# Patient Record
Sex: FEMALE | Race: WHITE | NOT HISPANIC OR LATINO | URBAN - METROPOLITAN AREA
[De-identification: names, ages, dates, MRNs, and addresses within clinical notes are randomized per-mention and may not be internally consistent; named-entity substitution may affect disease eponyms.]

---

## 2017-05-23 ENCOUNTER — OUTPATIENT (OUTPATIENT)
Dept: OUTPATIENT SERVICES | Facility: HOSPITAL | Age: 52
LOS: 1 days | Discharge: HOME | End: 2017-05-23
Payer: COMMERCIAL

## 2017-05-23 VITALS
SYSTOLIC BLOOD PRESSURE: 137 MMHG | HEIGHT: 69 IN | OXYGEN SATURATION: 100 % | DIASTOLIC BLOOD PRESSURE: 80 MMHG | RESPIRATION RATE: 14 BRPM | TEMPERATURE: 98 F | WEIGHT: 210.1 LBS | HEART RATE: 73 BPM

## 2017-05-23 DIAGNOSIS — N95.0 POSTMENOPAUSAL BLEEDING: ICD-10-CM

## 2017-05-23 DIAGNOSIS — Z98.890 OTHER SPECIFIED POSTPROCEDURAL STATES: Chronic | ICD-10-CM

## 2017-05-23 DIAGNOSIS — N85.01 BENIGN ENDOMETRIAL HYPERPLASIA: ICD-10-CM

## 2017-05-23 DIAGNOSIS — N94.19 OTHER SPECIFIED DYSPAREUNIA: ICD-10-CM

## 2017-05-23 DIAGNOSIS — Z98.891 HISTORY OF UTERINE SCAR FROM PREVIOUS SURGERY: Chronic | ICD-10-CM

## 2017-05-23 LAB
APPEARANCE UR: CLEAR — SIGNIFICANT CHANGE UP
BACTERIA # UR AUTO: (no result)
BASOPHILS # BLD AUTO: 0.1 K/UL — SIGNIFICANT CHANGE UP (ref 0–0.2)
BASOPHILS NFR BLD AUTO: 1.1 % — SIGNIFICANT CHANGE UP (ref 0–2)
BILIRUB UR-MCNC: NEGATIVE — SIGNIFICANT CHANGE UP
BLD GP AB SCN SERPL QL: SIGNIFICANT CHANGE UP
COLOR SPEC: YELLOW — SIGNIFICANT CHANGE UP
DIFF PNL FLD: NEGATIVE — SIGNIFICANT CHANGE UP
EOSINOPHIL # BLD AUTO: 0.7 K/UL — HIGH (ref 0–0.5)
EOSINOPHIL NFR BLD AUTO: 11.9 % — HIGH (ref 0–6)
EPI CELLS # UR: NEGATIVE — SIGNIFICANT CHANGE UP
GLUCOSE UR QL: NEGATIVE MG/DL — SIGNIFICANT CHANGE UP
HCT VFR BLD CALC: 39.9 % — SIGNIFICANT CHANGE UP (ref 34.5–45)
HGB BLD-MCNC: 13.7 G/DL — SIGNIFICANT CHANGE UP (ref 11.5–15.5)
KETONES UR-MCNC: NEGATIVE — SIGNIFICANT CHANGE UP
LEUKOCYTE ESTERASE UR-ACNC: (no result)
LYMPHOCYTES # BLD AUTO: 1.9 K/UL — SIGNIFICANT CHANGE UP (ref 1–3.3)
LYMPHOCYTES # BLD AUTO: 32.4 % — SIGNIFICANT CHANGE UP (ref 13–44)
MCHC RBC-ENTMCNC: 30.1 PG — SIGNIFICANT CHANGE UP (ref 27–34)
MCHC RBC-ENTMCNC: 34.4 GM/DL — SIGNIFICANT CHANGE UP (ref 32–36)
MCV RBC AUTO: 87.5 FL — SIGNIFICANT CHANGE UP (ref 80–100)
MONOCYTES # BLD AUTO: 0.3 K/UL — SIGNIFICANT CHANGE UP (ref 0–0.9)
MONOCYTES NFR BLD AUTO: 5.6 % — SIGNIFICANT CHANGE UP (ref 2–14)
NEUTROPHILS # BLD AUTO: 2.9 K/UL — SIGNIFICANT CHANGE UP (ref 1.8–7.4)
NEUTROPHILS NFR BLD AUTO: 49 % — SIGNIFICANT CHANGE UP (ref 43–77)
NITRITE UR-MCNC: NEGATIVE — SIGNIFICANT CHANGE UP
PH UR: 6.5 — SIGNIFICANT CHANGE UP (ref 5–8)
PLATELET # BLD AUTO: 208 K/UL — SIGNIFICANT CHANGE UP (ref 150–400)
PROT UR-MCNC: NEGATIVE MG/DL — SIGNIFICANT CHANGE UP
RBC # BLD: 4.56 M/UL — SIGNIFICANT CHANGE UP (ref 3.8–5.2)
RBC # FLD: 11.1 % — SIGNIFICANT CHANGE UP (ref 10.3–14.5)
RBC CASTS # UR COMP ASSIST: SIGNIFICANT CHANGE UP /HPF (ref 0–4)
SP GR SPEC: 1 — LOW (ref 1.01–1.02)
TYPE + AB SCN PNL BLD: SIGNIFICANT CHANGE UP
UROBILINOGEN FLD QL: NEGATIVE MG/DL — SIGNIFICANT CHANGE UP
WBC # BLD: 5.9 K/UL — SIGNIFICANT CHANGE UP (ref 3.8–10.5)
WBC # FLD AUTO: 5.9 K/UL — SIGNIFICANT CHANGE UP (ref 3.8–10.5)
WBC UR QL: SIGNIFICANT CHANGE UP

## 2017-05-23 PROCEDURE — 93010 ELECTROCARDIOGRAM REPORT: CPT

## 2017-05-23 NOTE — H&P PST ADULT - PSH
History of bladder repair surgery  bladder sling; 2006  S/P  section  X2; ,   S/P dilatation and curettage    S/P shoulder surgery  left;

## 2017-05-23 NOTE — H&P PST ADULT - PMH
History of alcoholism  Ceased drinking 1996.  History of hypercholesterolemia  not on medications at present  Menopausal bleeding  post  Obesity    Snores

## 2017-05-23 NOTE — H&P PST ADULT - FAMILY HISTORY
Mother  Still living? No  Family history of cerebrovascular accident (CVA) in mother, Age at diagnosis: 51-60     Father  Still living? No  Family history of heart disease, Age at diagnosis: 71-80  Family history of diabetes mellitus in father, Age at diagnosis: 71-80     Sibling  Still living? No  Family history of myocardial infarction at age less than 60, Age at diagnosis: 41-50

## 2017-05-23 NOTE — H&P PST ADULT - HISTORY OF PRESENT ILLNESS
This is a  51y /o Female who presents to [facility] prior to proposed procedure with Dr. Ulloa for robotic assisted single site total hysterectomy, bilateral salpingo-oophorectomy. Reports seen by primary GYN Dr. Braun for annual exam and reported intermittent bleeding while on HRT, and referred to Dr. Ulloa for further evaluation. Reports seen by Dr. Ulloa with internal sonogram, and review of internal sonogram. Denies any current vaginal bleeding, pelvic pain, or urinary symptoms. Evaluated by Dr. Ulloa and now presents for said procedure.

## 2017-05-23 NOTE — H&P PST ADULT - ASSESSMENT
This is a  51y /o Female who presents to PST prior to proposed procedure with Dr. Ulloa for robotic assisted single site total hysterectomy, bilateral salpingo-oophorectomy.     1. Labs: CBC, T&S, UA.  2. EKG to be reviewed by Cardiology.  3. Clearance with Dr. Van as scheduled.  4. EZ sponges, holistic sheet, pamphlet, and day of procedure instructions provided and reviewed with patient.

## 2017-05-26 RX ORDER — ONDANSETRON 8 MG/1
4 TABLET, FILM COATED ORAL ONCE
Qty: 0 | Refills: 0 | Status: COMPLETED | OUTPATIENT
Start: 2017-05-30 | End: 2017-05-30

## 2017-05-26 RX ORDER — FENTANYL CITRATE 50 UG/ML
50 INJECTION INTRAVENOUS
Qty: 0 | Refills: 0 | Status: DISCONTINUED | OUTPATIENT
Start: 2017-05-30 | End: 2017-05-30

## 2017-05-26 RX ORDER — OXYCODONE HYDROCHLORIDE 5 MG/1
10 TABLET ORAL ONCE
Qty: 0 | Refills: 0 | Status: DISCONTINUED | OUTPATIENT
Start: 2017-05-30 | End: 2017-05-30

## 2017-05-26 RX ORDER — CELECOXIB 200 MG/1
200 CAPSULE ORAL ONCE
Qty: 0 | Refills: 0 | Status: COMPLETED | OUTPATIENT
Start: 2017-05-30 | End: 2017-05-30

## 2017-05-26 RX ORDER — SODIUM CHLORIDE 9 MG/ML
1000 INJECTION INTRAMUSCULAR; INTRAVENOUS; SUBCUTANEOUS
Qty: 0 | Refills: 0 | Status: DISCONTINUED | OUTPATIENT
Start: 2017-05-30 | End: 2017-05-30

## 2017-05-26 RX ORDER — ACETAMINOPHEN 500 MG
975 TABLET ORAL ONCE
Qty: 0 | Refills: 0 | Status: COMPLETED | OUTPATIENT
Start: 2017-05-30 | End: 2017-05-30

## 2017-05-26 RX ORDER — OXYCODONE HYDROCHLORIDE 5 MG/1
10 TABLET ORAL EVERY 6 HOURS
Qty: 0 | Refills: 0 | Status: DISCONTINUED | OUTPATIENT
Start: 2017-05-30 | End: 2017-05-30

## 2017-05-30 ENCOUNTER — OUTPATIENT (OUTPATIENT)
Dept: OUTPATIENT SERVICES | Facility: HOSPITAL | Age: 52
LOS: 1 days | Discharge: HOME | End: 2017-05-30
Payer: COMMERCIAL

## 2017-05-30 ENCOUNTER — RESULT REVIEW (OUTPATIENT)
Age: 52
End: 2017-05-30

## 2017-05-30 VITALS
RESPIRATION RATE: 14 BRPM | WEIGHT: 210.1 LBS | HEIGHT: 69 IN | OXYGEN SATURATION: 100 % | TEMPERATURE: 97 F | HEART RATE: 82 BPM | DIASTOLIC BLOOD PRESSURE: 74 MMHG | SYSTOLIC BLOOD PRESSURE: 137 MMHG

## 2017-05-30 VITALS
HEART RATE: 99 BPM | RESPIRATION RATE: 16 BRPM | TEMPERATURE: 98 F | OXYGEN SATURATION: 100 % | SYSTOLIC BLOOD PRESSURE: 131 MMHG | DIASTOLIC BLOOD PRESSURE: 79 MMHG

## 2017-05-30 DIAGNOSIS — N94.19 OTHER SPECIFIED DYSPAREUNIA: ICD-10-CM

## 2017-05-30 DIAGNOSIS — Z79.890 HORMONE REPLACEMENT THERAPY: ICD-10-CM

## 2017-05-30 DIAGNOSIS — Z98.890 OTHER SPECIFIED POSTPROCEDURAL STATES: Chronic | ICD-10-CM

## 2017-05-30 DIAGNOSIS — N85.01 BENIGN ENDOMETRIAL HYPERPLASIA: ICD-10-CM

## 2017-05-30 DIAGNOSIS — Z98.891 HISTORY OF UTERINE SCAR FROM PREVIOUS SURGERY: Chronic | ICD-10-CM

## 2017-05-30 DIAGNOSIS — N95.0 POSTMENOPAUSAL BLEEDING: ICD-10-CM

## 2017-05-30 LAB
HCT VFR BLD CALC: 39.4 % — SIGNIFICANT CHANGE UP (ref 34.5–45)
HGB BLD-MCNC: 13.9 G/DL — SIGNIFICANT CHANGE UP (ref 11.5–15.5)

## 2017-05-30 PROCEDURE — S2900 ROBOTIC SURGICAL SYSTEM: CPT | Mod: NC

## 2017-05-30 PROCEDURE — 58571 TLH W/T/O 250 G OR LESS: CPT | Mod: 80

## 2017-05-30 PROCEDURE — 88307 TISSUE EXAM BY PATHOLOGIST: CPT | Mod: 26

## 2017-05-30 PROCEDURE — 88305 TISSUE EXAM BY PATHOLOGIST: CPT | Mod: 26

## 2017-05-30 RX ORDER — HYDROMORPHONE HYDROCHLORIDE 2 MG/ML
1 INJECTION INTRAMUSCULAR; INTRAVENOUS; SUBCUTANEOUS EVERY 4 HOURS
Qty: 0 | Refills: 0 | Status: DISCONTINUED | OUTPATIENT
Start: 2017-05-30 | End: 2017-05-30

## 2017-05-30 RX ORDER — SODIUM CHLORIDE 9 MG/ML
3 INJECTION INTRAMUSCULAR; INTRAVENOUS; SUBCUTANEOUS EVERY 8 HOURS
Qty: 0 | Refills: 0 | Status: DISCONTINUED | OUTPATIENT
Start: 2017-05-30 | End: 2017-05-30

## 2017-05-30 RX ORDER — IBUPROFEN 200 MG
600 TABLET ORAL EVERY 6 HOURS
Qty: 0 | Refills: 0 | Status: DISCONTINUED | OUTPATIENT
Start: 2017-05-30 | End: 2017-05-30

## 2017-05-30 RX ORDER — SODIUM CHLORIDE 9 MG/ML
1000 INJECTION, SOLUTION INTRAVENOUS
Qty: 0 | Refills: 0 | Status: DISCONTINUED | OUTPATIENT
Start: 2017-05-30 | End: 2017-05-30

## 2017-05-30 RX ORDER — ONDANSETRON 8 MG/1
4 TABLET, FILM COATED ORAL EVERY 6 HOURS
Qty: 0 | Refills: 0 | Status: DISCONTINUED | OUTPATIENT
Start: 2017-05-30 | End: 2017-05-30

## 2017-05-30 RX ADMIN — FENTANYL CITRATE 50 MICROGRAM(S): 50 INJECTION INTRAVENOUS at 18:55

## 2017-05-30 RX ADMIN — SODIUM CHLORIDE 75 MILLILITER(S): 9 INJECTION INTRAMUSCULAR; INTRAVENOUS; SUBCUTANEOUS at 19:00

## 2017-05-30 RX ADMIN — Medication 975 MILLIGRAM(S): at 12:40

## 2017-05-30 RX ADMIN — OXYCODONE HYDROCHLORIDE 10 MILLIGRAM(S): 5 TABLET ORAL at 12:40

## 2017-05-30 RX ADMIN — ONDANSETRON 4 MILLIGRAM(S): 8 TABLET, FILM COATED ORAL at 18:55

## 2017-05-30 RX ADMIN — CELECOXIB 200 MILLIGRAM(S): 200 CAPSULE ORAL at 12:40

## 2017-05-30 RX ADMIN — FENTANYL CITRATE 50 MICROGRAM(S): 50 INJECTION INTRAVENOUS at 20:00

## 2017-05-30 NOTE — BRIEF OPERATIVE NOTE - PROCEDURE
Exam under anesthesia  05/30/2017    Active  JRWAGNER  Lysis of adhesions  05/30/2017    Active  JRWAGNER  Bilateral salpingo-oophorectomy  05/30/2017    Active  JRWAGNER  Hysterectomy  05/30/2017    Active  JRWAGNER  Robot-assisted laparoscopic procedure using single incision technique  05/30/2017    Active  JRWAGNER Robot-assisted laparoscopic procedure using single incision technique  05/30/2017    Rupert Ventura  Lysis of adhesions  05/30/2017    Rupert Ventura  Bilateral salpingo-oophorectomy  05/30/2017    Rupert eVntura  Exam under anesthesia  05/30/2017    Rupert Ventura  Hysterectomy  05/30/2017    Rupert Ventura Robot-assisted laparoscopic procedure using single incision technique  05/30/2017    Rupert Ventura  Lysis of adhesions  05/30/2017    Rupert Ventura  Bilateral salpingo-oophorectomy  05/30/2017    Rupert Ventura  Exam under anesthesia  05/30/2017    Rupert Ventura  Hysterectomy  05/30/2017    Rupert Ventura

## 2017-05-30 NOTE — ASU DISCHARGE PLAN (ADULT/PEDIATRIC). - ACTIVITY LEVEL
no douching/no tampons/no intercourse/vaginal restrictions only/exercise/weight bearing as tolerated/nothing per vagina

## 2017-05-30 NOTE — BRIEF OPERATIVE NOTE - PRE-OP DX
Postmenopausal bleeding  05/30/2017  recurrent  Active  Rupert Ulloa  Vasomotor symptoms due to menopause  05/30/2017    Active  Rupert Ulloa

## 2017-05-30 NOTE — ASU PATIENT PROFILE, ADULT - VISION (WITH CORRECTIVE LENSES IF THE PATIENT USUALLY WEARS THEM):
Partially impaired: cannot see medication labels or newsprint, but can see obstacles in path, and the surrounding layout; can count fingers at arm's length/reading glasses PRN

## 2017-05-30 NOTE — BRIEF OPERATIVE NOTE - OPERATION/FINDINGS
6-8 week uterus; normal adnexa; normal abdomen 6-8 week uterus; normal adnexa; normal abdomen; normal bladder

## 2017-06-01 LAB — SURGICAL PATHOLOGY FINAL REPORT - CH: SIGNIFICANT CHANGE UP

## 2017-06-06 DIAGNOSIS — E66.9 OBESITY, UNSPECIFIED: ICD-10-CM

## 2017-06-06 DIAGNOSIS — Z88.8 ALLERGY STATUS TO OTHER DRUGS, MEDICAMENTS AND BIOLOGICAL SUBSTANCES STATUS: ICD-10-CM

## 2017-06-06 DIAGNOSIS — N73.6 FEMALE PELVIC PERITONEAL ADHESIONS (POSTINFECTIVE): ICD-10-CM

## 2017-06-06 DIAGNOSIS — R01.1 CARDIAC MURMUR, UNSPECIFIED: ICD-10-CM

## 2017-06-06 DIAGNOSIS — Z79.890 HORMONE REPLACEMENT THERAPY: ICD-10-CM

## 2017-06-06 DIAGNOSIS — N72 INFLAMMATORY DISEASE OF CERVIX UTERI: ICD-10-CM

## 2017-06-06 DIAGNOSIS — E78.5 HYPERLIPIDEMIA, UNSPECIFIED: ICD-10-CM

## 2017-06-06 DIAGNOSIS — N80.0 ENDOMETRIOSIS OF UTERUS: ICD-10-CM

## 2017-06-06 DIAGNOSIS — N95.0 POSTMENOPAUSAL BLEEDING: ICD-10-CM

## 2018-01-15 ENCOUNTER — EMERGENCY (EMERGENCY)
Facility: HOSPITAL | Age: 53
LOS: 0 days | Discharge: HOME | End: 2018-01-15
Attending: EMERGENCY MEDICINE | Admitting: EMERGENCY MEDICINE
Payer: COMMERCIAL

## 2018-01-15 VITALS — WEIGHT: 199.96 LBS | HEIGHT: 69 IN

## 2018-01-15 VITALS
HEART RATE: 95 BPM | DIASTOLIC BLOOD PRESSURE: 80 MMHG | RESPIRATION RATE: 16 BRPM | TEMPERATURE: 98 F | OXYGEN SATURATION: 100 % | SYSTOLIC BLOOD PRESSURE: 140 MMHG

## 2018-01-15 DIAGNOSIS — Z98.890 OTHER SPECIFIED POSTPROCEDURAL STATES: Chronic | ICD-10-CM

## 2018-01-15 DIAGNOSIS — R05 COUGH: ICD-10-CM

## 2018-01-15 DIAGNOSIS — B34.9 VIRAL INFECTION, UNSPECIFIED: ICD-10-CM

## 2018-01-15 DIAGNOSIS — Z98.891 HISTORY OF UTERINE SCAR FROM PREVIOUS SURGERY: Chronic | ICD-10-CM

## 2018-01-15 LAB
ANION GAP SERPL CALC-SCNC: 7 MMOL/L — SIGNIFICANT CHANGE UP (ref 5–17)
BASOPHILS # BLD AUTO: 0.1 K/UL — SIGNIFICANT CHANGE UP (ref 0–0.2)
BASOPHILS NFR BLD AUTO: 1.2 % — SIGNIFICANT CHANGE UP (ref 0–2)
BUN SERPL-MCNC: 12 MG/DL — SIGNIFICANT CHANGE UP (ref 7–23)
CALCIUM SERPL-MCNC: 9.2 MG/DL — SIGNIFICANT CHANGE UP (ref 8.5–10.1)
CHLORIDE SERPL-SCNC: 103 MMOL/L — SIGNIFICANT CHANGE UP (ref 96–108)
CO2 SERPL-SCNC: 26 MMOL/L — SIGNIFICANT CHANGE UP (ref 22–31)
CREAT SERPL-MCNC: 0.81 MG/DL — SIGNIFICANT CHANGE UP (ref 0.5–1.3)
EOSINOPHIL # BLD AUTO: 0.9 K/UL — HIGH (ref 0–0.5)
EOSINOPHIL NFR BLD AUTO: 11.3 % — HIGH (ref 0–6)
GLUCOSE SERPL-MCNC: 112 MG/DL — HIGH (ref 70–99)
HCT VFR BLD CALC: 42.3 % — SIGNIFICANT CHANGE UP (ref 34.5–45)
HGB BLD-MCNC: 14.4 G/DL — SIGNIFICANT CHANGE UP (ref 11.5–15.5)
LYMPHOCYTES # BLD AUTO: 1.5 K/UL — SIGNIFICANT CHANGE UP (ref 1–3.3)
LYMPHOCYTES # BLD AUTO: 19.1 % — SIGNIFICANT CHANGE UP (ref 13–44)
MCHC RBC-ENTMCNC: 29.2 PG — SIGNIFICANT CHANGE UP (ref 27–34)
MCHC RBC-ENTMCNC: 34.1 GM/DL — SIGNIFICANT CHANGE UP (ref 32–36)
MCV RBC AUTO: 85.6 FL — SIGNIFICANT CHANGE UP (ref 80–100)
MONOCYTES # BLD AUTO: 0.6 K/UL — SIGNIFICANT CHANGE UP (ref 0–0.9)
MONOCYTES NFR BLD AUTO: 8.1 % — SIGNIFICANT CHANGE UP (ref 2–14)
NEUTROPHILS # BLD AUTO: 4.6 K/UL — SIGNIFICANT CHANGE UP (ref 1.8–7.4)
NEUTROPHILS NFR BLD AUTO: 60.2 % — SIGNIFICANT CHANGE UP (ref 43–77)
PLATELET # BLD AUTO: 216 K/UL — SIGNIFICANT CHANGE UP (ref 150–400)
POTASSIUM SERPL-MCNC: 4.2 MMOL/L — SIGNIFICANT CHANGE UP (ref 3.5–5.3)
POTASSIUM SERPL-SCNC: 4.2 MMOL/L — SIGNIFICANT CHANGE UP (ref 3.5–5.3)
RBC # BLD: 4.95 M/UL — SIGNIFICANT CHANGE UP (ref 3.8–5.2)
RBC # FLD: 11.5 % — SIGNIFICANT CHANGE UP (ref 10.3–14.5)
SODIUM SERPL-SCNC: 136 MMOL/L — SIGNIFICANT CHANGE UP (ref 135–145)
WBC # BLD: 7.7 K/UL — SIGNIFICANT CHANGE UP (ref 3.8–10.5)
WBC # FLD AUTO: 7.7 K/UL — SIGNIFICANT CHANGE UP (ref 3.8–10.5)

## 2018-01-15 PROCEDURE — 71046 X-RAY EXAM CHEST 2 VIEWS: CPT | Mod: 26

## 2018-01-15 PROCEDURE — 99285 EMERGENCY DEPT VISIT HI MDM: CPT | Mod: 25

## 2018-01-15 RX ORDER — ALBUTEROL 90 UG/1
2 AEROSOL, METERED ORAL
Qty: 1 | Refills: 0
Start: 2018-01-15 | End: 2018-02-13

## 2018-01-15 RX ORDER — ONDANSETRON 8 MG/1
4 TABLET, FILM COATED ORAL ONCE
Qty: 0 | Refills: 0 | Status: COMPLETED | OUTPATIENT
Start: 2018-01-15 | End: 2018-01-15

## 2018-01-15 RX ORDER — IPRATROPIUM/ALBUTEROL SULFATE 18-103MCG
3 AEROSOL WITH ADAPTER (GRAM) INHALATION ONCE
Qty: 0 | Refills: 0 | Status: COMPLETED | OUTPATIENT
Start: 2018-01-15 | End: 2018-01-15

## 2018-01-15 RX ADMIN — Medication 3 MILLILITER(S): at 22:31

## 2018-01-15 NOTE — ED ADULT TRIAGE NOTE - CHIEF COMPLAINT QUOTE
Pt c/o + pnx, bronchitis + flu _ , left lung inflammed - per pt's MD Pt c/o + pnx, +bronchitis + flu _ , left lung inflammed - per pt's MD

## 2018-01-15 NOTE — ED STATDOCS - ATTENDING CONTRIBUTION TO CARE
Attending Contribution to Care: I, Amparo Buchanan, performed the initial face to face bedside interview with this patient regarding history of present illness, review of symptoms and relevant past medical, social and family history.  I completed an independent physical examination.  I was the initial provider who evaluated this patient and the history, physical, and MDM reflect this intial assessment. I have signed out the follow up of any pending tests after the original (i.e. labs, radiological studies) to the ACP with instructions to review any with instructions to review any concerning findings to me prior to discharge.  I have communicated the patient’s plan of care and disposition with the ACP.

## 2018-01-15 NOTE — ED STATDOCS - PROGRESS NOTE DETAILS
Patient seen and evaluated, ED attending note and orders reviewed, will continue with patient follow up and care -Cam Cortez PA-C

## 2018-01-15 NOTE — ED STATDOCS - OBJECTIVE STATEMENT
51 y/o F w/ pmhx of HLD presents to ED c/o dry cough and difficulty breathing. Pt states she was tested positive for PNA and Bronchitis yesterday at Urgent care, give amoxicillin but w/o relief. MD stated her left lung was inflamed. PMD Dr. Guzmán. No other concerns.

## 2018-01-15 NOTE — ED ADULT NURSE REASSESSMENT NOTE - NS ED NURSE REASSESS COMMENT FT1
Pt c/o nausea and feeling hot. Vitals stable. JUSTA Cortez made aware. Will cont to monitor for safety and comfort.

## 2018-06-30 ENCOUNTER — EMERGENCY (EMERGENCY)
Facility: HOSPITAL | Age: 53
LOS: 0 days | Discharge: HOME | End: 2018-06-30
Attending: EMERGENCY MEDICINE
Payer: COMMERCIAL

## 2018-06-30 VITALS
DIASTOLIC BLOOD PRESSURE: 85 MMHG | HEART RATE: 89 BPM | SYSTOLIC BLOOD PRESSURE: 140 MMHG | OXYGEN SATURATION: 100 % | RESPIRATION RATE: 16 BRPM | TEMPERATURE: 98 F

## 2018-06-30 VITALS — HEIGHT: 69 IN | WEIGHT: 190.04 LBS

## 2018-06-30 DIAGNOSIS — S93.402A SPRAIN OF UNSPECIFIED LIGAMENT OF LEFT ANKLE, INITIAL ENCOUNTER: ICD-10-CM

## 2018-06-30 DIAGNOSIS — E78.5 HYPERLIPIDEMIA, UNSPECIFIED: ICD-10-CM

## 2018-06-30 DIAGNOSIS — M25.572 PAIN IN LEFT ANKLE AND JOINTS OF LEFT FOOT: ICD-10-CM
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
## 2018-06-30 DIAGNOSIS — Z98.890 OTHER SPECIFIED POSTPROCEDURAL STATES: Chronic | ICD-10-CM
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
## 2018-06-30 DIAGNOSIS — F32.9 MAJOR DEPRESSIVE DISORDER, SINGLE EPISODE, UNSPECIFIED: ICD-10-CM

## 2018-06-30 DIAGNOSIS — F41.9 ANXIETY DISORDER, UNSPECIFIED: ICD-10-CM

## 2018-06-30 DIAGNOSIS — Z98.891 HISTORY OF UTERINE SCAR FROM PREVIOUS SURGERY: Chronic | ICD-10-CM

## 2018-06-30 PROCEDURE — 99283 EMERGENCY DEPT VISIT LOW MDM: CPT | Mod: 25

## 2018-06-30 PROCEDURE — 73620 X-RAY EXAM OF FOOT: CPT | Mod: 26

## 2018-06-30 PROCEDURE — 73610 X-RAY EXAM OF ANKLE: CPT | Mod: 26,LT

## 2018-06-30 NOTE — ED STATDOCS - OBJECTIVE STATEMENT
52F with pmh HLD, depression/anxiety, presents with L ankle pain, swellign s/p inversion injury. pt inverted ankle while stepping off bed two days ago, felt a "pop" and pain at that time but then improved. iced and elevated. today was stepping down step, inverted once again. pain at that tiem but mostly resolved. +swelling lateral aspect with min discomfort with ambulation. without numbness/weakness. without fall or any other injury. - Meño Watts MD

## 2018-06-30 NOTE — ED STATDOCS - MEDICAL DECISION MAKING DETAILS
L ankle swelling, minimal ttp s/p inversion injury. plan to obtain xrays, low suspicion of fracture. likely ace wrap and d/c. - Meño Watts MD

## 2018-06-30 NOTE — ED STATDOCS - PROGRESS NOTE DETAILS
pt re-assessed. feels improved. updated on neg xray results. discussed proper f/u instructions and indications to return. able to ambulate without assistance. An opportunity to ask questions was provided and all answered. stable for d/c. - Meño Watts MD

## 2018-06-30 NOTE — ED STATDOCS - PLAN OF CARE
1. Rest, ice, and elevated ankle  2. Take Tylenol as directed for pain.   3. Take motrin, 600mg every 6 hours as needed for pain.   4. Follow-up with your primary care doctor or [facility] at [phone] in 3-5 days   5. Return immediately for any worsening or concerning symptoms as discussed

## 2018-06-30 NOTE — ED ADULT NURSE NOTE - OBJECTIVE STATEMENT
s/p mechanical fall on sidewalk, fell to ground, denies head injury. c/o lateral left ankle pain. able to ambulate but with pain

## 2018-06-30 NOTE — ED STATDOCS - CARE PLAN
Principal Discharge DX:	Sprain of left ankle, unspecified ligament, initial encounter  Assessment and plan of treatment:	1. Rest, ice, and elevated ankle  2. Take Tylenol as directed for pain.   3. Take motrin, 600mg every 6 hours as needed for pain.   4. Follow-up with your primary care doctor or [facility] at [phone] in 3-5 days   5. Return immediately for any worsening or concerning symptoms as discussed

## 2018-09-18 PROBLEM — N92.4 EXCESSIVE BLEEDING IN THE PREMENOPAUSAL PERIOD: Chronic | Status: ACTIVE | Noted: 2017-05-23

## 2018-09-18 PROBLEM — Z86.39 PERSONAL HISTORY OF OTHER ENDOCRINE, NUTRITIONAL AND METABOLIC DISEASE: Chronic | Status: ACTIVE | Noted: 2017-05-23

## 2018-09-18 PROBLEM — R06.83 SNORING: Chronic | Status: ACTIVE | Noted: 2017-05-23

## 2018-09-18 PROBLEM — F10.21 ALCOHOL DEPENDENCE, IN REMISSION: Chronic | Status: ACTIVE | Noted: 2017-05-23

## 2018-09-18 PROBLEM — E66.9 OBESITY, UNSPECIFIED: Chronic | Status: ACTIVE | Noted: 2017-05-23

## 2018-09-26 ENCOUNTER — APPOINTMENT (OUTPATIENT)
Dept: CARDIOLOGY | Facility: CLINIC | Age: 53
End: 2018-09-26
Payer: COMMERCIAL

## 2018-09-26 VITALS
HEART RATE: 97 BPM | SYSTOLIC BLOOD PRESSURE: 140 MMHG | BODY MASS INDEX: 29.77 KG/M2 | DIASTOLIC BLOOD PRESSURE: 80 MMHG | WEIGHT: 201 LBS | RESPIRATION RATE: 16 BRPM | HEIGHT: 69 IN

## 2018-09-26 DIAGNOSIS — Z82.49 FAMILY HISTORY OF ISCHEMIC HEART DISEASE AND OTHER DISEASES OF THE CIRCULATORY SYSTEM: ICD-10-CM

## 2018-09-26 DIAGNOSIS — Z83.6 FAMILY HISTORY OF OTHER DISEASES OF THE RESPIRATORY SYSTEM: ICD-10-CM

## 2018-09-26 DIAGNOSIS — F10.21 ALCOHOL DEPENDENCE, IN REMISSION: ICD-10-CM

## 2018-09-26 DIAGNOSIS — Z78.9 OTHER SPECIFIED HEALTH STATUS: ICD-10-CM

## 2018-09-26 PROCEDURE — 93000 ELECTROCARDIOGRAM COMPLETE: CPT

## 2018-09-26 PROCEDURE — 99204 OFFICE O/P NEW MOD 45 MIN: CPT

## 2018-09-26 RX ORDER — ROSUVASTATIN CALCIUM 10 MG/1
10 TABLET, FILM COATED ORAL
Qty: 90 | Refills: 0 | Status: ACTIVE | COMMUNITY

## 2018-10-26 ENCOUNTER — APPOINTMENT (OUTPATIENT)
Dept: CARDIOLOGY | Facility: CLINIC | Age: 53
End: 2018-10-26
Payer: COMMERCIAL

## 2018-10-26 PROCEDURE — 93306 TTE W/DOPPLER COMPLETE: CPT

## 2018-10-30 ENCOUNTER — APPOINTMENT (OUTPATIENT)
Dept: CARDIOLOGY | Facility: CLINIC | Age: 53
End: 2018-10-30
Payer: COMMERCIAL

## 2018-10-30 PROCEDURE — 93015 CV STRESS TEST SUPVJ I&R: CPT

## 2018-10-30 PROCEDURE — 78452 HT MUSCLE IMAGE SPECT MULT: CPT

## 2018-10-30 PROCEDURE — A9500: CPT

## 2018-11-01 RX ORDER — KIT FOR THE PREPARATION OF TECHNETIUM TC99M SESTAMIBI 1 MG/5ML
INJECTION, POWDER, LYOPHILIZED, FOR SOLUTION PARENTERAL
Refills: 0 | Status: COMPLETED | OUTPATIENT
Start: 2018-11-01

## 2018-11-01 RX ADMIN — KIT FOR THE PREPARATION OF TECHNETIUM TC99M SESTAMIBI 0: 1 INJECTION, POWDER, LYOPHILIZED, FOR SOLUTION PARENTERAL at 00:00

## 2018-11-09 ENCOUNTER — APPOINTMENT (OUTPATIENT)
Dept: CARDIOLOGY | Facility: CLINIC | Age: 53
End: 2018-11-09
Payer: COMMERCIAL

## 2018-11-09 VITALS
HEART RATE: 78 BPM | HEIGHT: 69 IN | BODY MASS INDEX: 30.07 KG/M2 | SYSTOLIC BLOOD PRESSURE: 120 MMHG | RESPIRATION RATE: 18 BRPM | WEIGHT: 203 LBS | DIASTOLIC BLOOD PRESSURE: 80 MMHG

## 2018-11-09 PROCEDURE — 99214 OFFICE O/P EST MOD 30 MIN: CPT

## 2018-11-09 RX ORDER — METOPROLOL SUCCINATE 25 MG/1
25 TABLET, EXTENDED RELEASE ORAL DAILY
Qty: 90 | Refills: 3 | Status: DISCONTINUED | COMMUNITY
Start: 2018-09-26 | End: 2018-11-09

## 2018-11-09 NOTE — ASSESSMENT
[FreeTextEntry1] : ECG:   Normal sinus rhythm at 78 bpm.  Poor R-wave progression.  No significant ST-T wave changes. \par \par Echocardiogram 10/26/18:\par  Overall normal LV size and function. Ejection fraction 60%. Mild focal aortic valve sclerosis.\par \par Exercise sestamibi stress test 10/30/18:\par Patient exercised 9 minutes 45 seconds (11 Mets).\par Peak heart rate 173 (104% maximum).\par Blood pressure response normal\par No ischemic ECG changes\par No chest pain.\par SPECT imaging shows no evidence of any ischemia.\par \par No laboratory data is available. \par \par Impression: \par 1. This is a 53-year-old female with surgical menopause more than a year ago presenting with a chest pain syndrome that has some typical and atypical features.  Additionally, she has risk factors of hyperlipidemia, very strong family history of premature coronary disease, and a chest pain syndrome. \par \par The stress test reveals good functional capacity with no evidence of reproducible pain or ischemic ECG changes or perfusion abnormalities had a very good workload. As such, I think it highly unlikely that the patient's chest pain or shortness of breath is due to active coronary artery disease. She was reassured of this.\par The arm pain does not seem cardiac in nature.   More likely is musculoskeletal, though a radiculopathy cannot be excluded.  \par \par \par Because of her heart murmur and shortness of breath, an echocardiogram was performed and demonstrated a focal aortic valve sclerosis which likely accounts for the murmur. There are no other significant structural abnormalities.\par \par Copies of her blood work will be obtained and reviewed.  \par I encouraged the patient to begin a regular symptom limited graduated exercise program. She is to contact me with regard to any new or progression of symptoms. She is to contact me to review her blood work. A 6 month followup is recommended.\par Pending the results of the testing, it is recommended that the patient begin Ecotrin 81 mg a day and Toprol XL 25 mg a day. \par

## 2018-11-09 NOTE — REASON FOR VISIT
[FreeTextEntry1] : 53-year-old female presents here for cardiac re- evaluation after her non invasive testing..  Her concerns include chest pain and left arm pain.\par \par Symptoms began a couple of weeks ago.  She describes a squeezing retrosternal discomfort which can occur nearly every day, last 1-2 minutes at a time.  It can occur when she is at work, but not necessary with any position, movement, or activity.  \par \par She has had exertional dyspnea for the last 6 months but does not describe any exertional chest pain.  \par \par She additional complains of some left arm pain, however, this does not necessarily occur in tandem with the chest pain.  That seems to have a positional component. \par \par There is no preexisting history of any cardiovascular disease but she has had some heart flutters.  \par \par She is under a fair amount of stress within the last few months having had some major domestic upheaval in her life.  \par \par Denies diabetes or hypertension, however does have:\par 1.  A 20-pack year of cigarette smoking stopped 15 years ago.   \par 2.  Hyperlipidemia.\par 3.  A strong family history of premature coronary disease which includes:\par 	1.  Brother  of MI age 43. \par 	2.  Father bypassed in his 60s and then had a redo. \par 	3.  Mother had a traumatic hemorrhagic CVA.\par \par She does not exercise regularly.  She does not exercise regularly.  She describes extreme fatigue from her very busy job and family life.  \par \par She does not have any other significant medical history.  \par

## 2018-12-21 ENCOUNTER — APPOINTMENT (OUTPATIENT)
Dept: OBGYN | Facility: CLINIC | Age: 53
End: 2018-12-21
Payer: COMMERCIAL

## 2018-12-21 VITALS
WEIGHT: 203 LBS | HEIGHT: 69 IN | DIASTOLIC BLOOD PRESSURE: 80 MMHG | SYSTOLIC BLOOD PRESSURE: 122 MMHG | BODY MASS INDEX: 30.07 KG/M2

## 2018-12-21 PROCEDURE — 99214 OFFICE O/P EST MOD 30 MIN: CPT

## 2018-12-21 RX ORDER — ESTRADIOL AND LEVONORGESTREL .045; .015 MG/D; MG/D
0.045-0.015 PATCH TRANSDERMAL
Refills: 0 | Status: COMPLETED | COMMUNITY
End: 2018-12-21

## 2019-05-01 ENCOUNTER — APPOINTMENT (OUTPATIENT)
Dept: CARDIOLOGY | Facility: CLINIC | Age: 54
End: 2019-05-01
Payer: COMMERCIAL

## 2019-05-01 ENCOUNTER — NON-APPOINTMENT (OUTPATIENT)
Age: 54
End: 2019-05-01

## 2019-05-01 VITALS
HEIGHT: 69 IN | WEIGHT: 206 LBS | BODY MASS INDEX: 30.51 KG/M2 | RESPIRATION RATE: 16 BRPM | DIASTOLIC BLOOD PRESSURE: 70 MMHG | SYSTOLIC BLOOD PRESSURE: 130 MMHG | HEART RATE: 83 BPM

## 2019-05-01 PROCEDURE — 93000 ELECTROCARDIOGRAM COMPLETE: CPT

## 2019-05-01 PROCEDURE — 99214 OFFICE O/P EST MOD 30 MIN: CPT

## 2019-05-01 NOTE — REASON FOR VISIT
[FreeTextEntry1] : 53-year-old female presents here for cardiac re- evaluation.  Her chest pain, WERNER and left arm pain have all resolved.\par \par She has been going to the gym 3 times a week for the last several months. 20 minutes of aerobic exercise and 40 minutes of resistance. There are no associated symptoms when she exercises.\par It is watching her diet more carefully, however, has not had any weight loss.\par \par Symptoms were a squeezing retrosternal discomfort  nearly every day, last 1-2 minutes at a time.  It can occur when she is at work, but not necessary with any position, movement, or activity.  \par She has had exertional dyspnea for the last 6 months but does not describe any exertional chest pain.  \par left arm pain did not necessarily occur in tandem with the chest pain.  That seems to have a positional component. \par \par There is no preexisting history of any cardiovascular disease but she has had some heart flutters.  \par \par She is under a fair amount of stress within the last few months having had some major domestic upheaval in her life.  \par \par Denies diabetes or hypertension, however does have:\par 1.  A 20-pack year of cigarette smoking stopped 15 years ago.   \par 2.  Hyperlipidemia.\par 3.  A strong family history of premature coronary disease which includes:\par 	1.  Brother  of MI age 43. \par 	2.  Father bypassed in his 60s and then had a redo. \par 	3.  Mother had a traumatic hemorrhagic CVA.\par \par She does not have any other significant medical history.  \par

## 2019-05-01 NOTE — ASSESSMENT
[FreeTextEntry1] : ECG:   Normal sinus rhythm at 83 bpm.  Poor R-wave progression.  No significant ST-T wave changes. \par \par Echocardiogram 10/26/18:\par  Overall normal LV size and function. Ejection fraction 60%. Mild focal aortic valve sclerosis.\par \par Exercise sestamibi stress test 10/30/18:\par Patient exercised 9 minutes 45 seconds (11 Mets).\par Peak heart rate 173 (104% maximum).\par Blood pressure response normal\par No ischemic ECG changes\par No chest pain.\par SPECT imaging shows no evidence of any ischemia.\par \par No laboratory data is available. \par \par Impression: \par 1. This is a 53-year-old female with surgical menopause more than a year ago with a chest pain syndrome that had some typical and atypical features, but has now entirely resolved.  \par \par 2. She has risk factors of hyperlipidemia, very strong family history of premature coronary disease\par \par 3. The stress test reveals good functional capacity with no evidence of reproducible pain or ischemic ECG changes or perfusion abnormalities had a very good workload. \par \par 4. Because of her heart murmur and shortness of breath, an echocardiogram was performed and demonstrated a focal aortic valve sclerosis which likely accounts for the murmur. There are no other significant structural abnormalities.\par \par Plan;  \par 1. \par I encouraged the patient to accelerate her exercise program. She is to contact me with regard to any new or progression of symptoms.\par \par 2.  She is to obtain blood work. \par \par 3. Instructed the patient about the benefits of a diet that restricts portion sizes, increases frequency of meals and consists of  vegetables, (more green and leafy),fruit and nuts, whole grains, lean proteins and limits carbohydrates and meat and dairy fats\par \par A 6 month followup is recommended.\par Pending the results of the testing, it is recommended that the patient begin Ecotrin 81 mg a day and Toprol XL 25 mg a day. \par

## 2019-06-12 ENCOUNTER — RX RENEWAL (OUTPATIENT)
Age: 54
End: 2019-06-12

## 2019-06-19 ENCOUNTER — RX RENEWAL (OUTPATIENT)
Age: 54
End: 2019-06-19

## 2019-10-11 ENCOUNTER — APPOINTMENT (OUTPATIENT)
Dept: CARDIOLOGY | Facility: CLINIC | Age: 54
End: 2019-10-11

## 2019-12-04 NOTE — ASU PREOP CHECKLIST - NS PREOP CHK CHLOROHEX WASH
I have reviewed discharge instructions with the patient and spouse. The patient and spouse verbalized understanding. Pt provided with prescriptions, discharge instruction, and information sheets. No further questions at this time. at home:

## 2020-01-06 ENCOUNTER — RX RENEWAL (OUTPATIENT)
Age: 55
End: 2020-01-06

## 2020-08-25 ENCOUNTER — APPOINTMENT (OUTPATIENT)
Dept: OBGYN | Facility: CLINIC | Age: 55
End: 2020-08-25
Payer: COMMERCIAL

## 2020-08-25 VITALS
HEIGHT: 69 IN | BODY MASS INDEX: 31.25 KG/M2 | SYSTOLIC BLOOD PRESSURE: 140 MMHG | DIASTOLIC BLOOD PRESSURE: 84 MMHG | TEMPERATURE: 98.2 F | WEIGHT: 211 LBS

## 2020-08-25 DIAGNOSIS — Z87.891 PERSONAL HISTORY OF NICOTINE DEPENDENCE: ICD-10-CM

## 2020-08-25 DIAGNOSIS — N95.1 MENOPAUSAL AND FEMALE CLIMACTERIC STATES: ICD-10-CM

## 2020-08-25 PROCEDURE — 99396 PREV VISIT EST AGE 40-64: CPT

## 2020-08-25 NOTE — PHYSICAL EXAM
[Awake] : awake [Alert] : alert [Acute Distress] : no acute distress [LAD] : no lymphadenopathy [Mass] : no breast mass [Thyroid Nodule] : no thyroid nodule [Goiter] : no goiter [Soft] : soft [Nipple Discharge] : no nipple discharge [Axillary LAD] : no axillary lymphadenopathy [Distended] : not distended [Tender] : non tender [H/Smegaly] : no hepatosplenomegaly [Oriented x3] : oriented to person, place, and time [Flat Affect] : affect not flat [Depressed Mood] : not depressed [Vulvar Atrophy] : no vulvar atrophy [Normal] : external genitalia [No Bleeding] : there was no active vaginal bleeding [Atrophy] : atrophy [Occult Blood] : occult blood test from digital rectal exam was negative [Uterine Adnexae] : were not tender and not enlarged [Absent] : absent

## 2020-08-25 NOTE — CHIEF COMPLAINT
[Annual Visit] : annual visit [FreeTextEntry1] : 54 y/o pt presents for annual visit with hx of hysterectomy

## 2020-09-14 ENCOUNTER — RESULT REVIEW (OUTPATIENT)
Age: 55
End: 2020-09-14

## 2020-09-14 ENCOUNTER — APPOINTMENT (OUTPATIENT)
Dept: MAMMOGRAPHY | Facility: CLINIC | Age: 55
End: 2020-09-14
Payer: COMMERCIAL

## 2020-09-14 ENCOUNTER — OUTPATIENT (OUTPATIENT)
Dept: OUTPATIENT SERVICES | Facility: HOSPITAL | Age: 55
LOS: 1 days | End: 2020-09-14
Payer: COMMERCIAL

## 2020-09-14 DIAGNOSIS — Z98.890 OTHER SPECIFIED POSTPROCEDURAL STATES: Chronic | ICD-10-CM

## 2020-09-14 DIAGNOSIS — Z00.8 ENCOUNTER FOR OTHER GENERAL EXAMINATION: ICD-10-CM

## 2020-09-14 DIAGNOSIS — Z98.891 HISTORY OF UTERINE SCAR FROM PREVIOUS SURGERY: Chronic | ICD-10-CM

## 2020-09-14 PROCEDURE — 77067 SCR MAMMO BI INCL CAD: CPT

## 2020-09-14 PROCEDURE — 77063 BREAST TOMOSYNTHESIS BI: CPT | Mod: 26

## 2020-09-14 PROCEDURE — 77067 SCR MAMMO BI INCL CAD: CPT | Mod: 26

## 2020-09-14 PROCEDURE — 77063 BREAST TOMOSYNTHESIS BI: CPT

## 2020-09-28 ENCOUNTER — TRANSCRIPTION ENCOUNTER (OUTPATIENT)
Age: 55
End: 2020-09-28

## 2021-02-25 ENCOUNTER — INPATIENT (INPATIENT)
Facility: HOSPITAL | Age: 56
LOS: 0 days | Discharge: HOME | DRG: 287 | End: 2021-02-26
Attending: FAMILY MEDICINE | Admitting: HOSPITALIST
Payer: COMMERCIAL

## 2021-02-25 VITALS
SYSTOLIC BLOOD PRESSURE: 157 MMHG | OXYGEN SATURATION: 98 % | RESPIRATION RATE: 18 BRPM | TEMPERATURE: 97 F | HEIGHT: 69 IN | DIASTOLIC BLOOD PRESSURE: 88 MMHG | HEART RATE: 83 BPM

## 2021-02-25 DIAGNOSIS — Z98.890 OTHER SPECIFIED POSTPROCEDURAL STATES: Chronic | ICD-10-CM

## 2021-02-25 DIAGNOSIS — R07.9 CHEST PAIN, UNSPECIFIED: ICD-10-CM

## 2021-02-25 DIAGNOSIS — Z98.891 HISTORY OF UTERINE SCAR FROM PREVIOUS SURGERY: Chronic | ICD-10-CM

## 2021-02-25 LAB
ALBUMIN SERPL ELPH-MCNC: 4.3 G/DL — SIGNIFICANT CHANGE UP (ref 3.3–5)
ALP SERPL-CCNC: 93 U/L — SIGNIFICANT CHANGE UP (ref 40–120)
ALT FLD-CCNC: 36 U/L — SIGNIFICANT CHANGE UP (ref 12–78)
ANION GAP SERPL CALC-SCNC: 7 MMOL/L — SIGNIFICANT CHANGE UP (ref 5–17)
AST SERPL-CCNC: 25 U/L — SIGNIFICANT CHANGE UP (ref 15–37)
BASOPHILS # BLD AUTO: 0.03 K/UL — SIGNIFICANT CHANGE UP (ref 0–0.2)
BASOPHILS NFR BLD AUTO: 0.4 % — SIGNIFICANT CHANGE UP (ref 0–2)
BILIRUB SERPL-MCNC: 0.5 MG/DL — SIGNIFICANT CHANGE UP (ref 0.2–1.2)
BUN SERPL-MCNC: 14 MG/DL — SIGNIFICANT CHANGE UP (ref 7–23)
CALCIUM SERPL-MCNC: 9.5 MG/DL — SIGNIFICANT CHANGE UP (ref 8.5–10.1)
CHLORIDE SERPL-SCNC: 105 MMOL/L — SIGNIFICANT CHANGE UP (ref 96–108)
CO2 SERPL-SCNC: 27 MMOL/L — SIGNIFICANT CHANGE UP (ref 22–31)
CREAT SERPL-MCNC: 0.82 MG/DL — SIGNIFICANT CHANGE UP (ref 0.5–1.3)
EOSINOPHIL # BLD AUTO: 0.36 K/UL — SIGNIFICANT CHANGE UP (ref 0–0.5)
EOSINOPHIL NFR BLD AUTO: 5.2 % — SIGNIFICANT CHANGE UP (ref 0–6)
GLUCOSE SERPL-MCNC: 99 MG/DL — SIGNIFICANT CHANGE UP (ref 70–99)
HCT VFR BLD CALC: 41.4 % — SIGNIFICANT CHANGE UP (ref 34.5–45)
HGB BLD-MCNC: 14 G/DL — SIGNIFICANT CHANGE UP (ref 11.5–15.5)
IMM GRANULOCYTES NFR BLD AUTO: 0.3 % — SIGNIFICANT CHANGE UP (ref 0–1.5)
LYMPHOCYTES # BLD AUTO: 1.63 K/UL — SIGNIFICANT CHANGE UP (ref 1–3.3)
LYMPHOCYTES # BLD AUTO: 23.8 % — SIGNIFICANT CHANGE UP (ref 13–44)
MCHC RBC-ENTMCNC: 29.2 PG — SIGNIFICANT CHANGE UP (ref 27–34)
MCHC RBC-ENTMCNC: 33.8 GM/DL — SIGNIFICANT CHANGE UP (ref 32–36)
MCV RBC AUTO: 86.3 FL — SIGNIFICANT CHANGE UP (ref 80–100)
MONOCYTES # BLD AUTO: 0.34 K/UL — SIGNIFICANT CHANGE UP (ref 0–0.9)
MONOCYTES NFR BLD AUTO: 5 % — SIGNIFICANT CHANGE UP (ref 2–14)
NEUTROPHILS # BLD AUTO: 4.48 K/UL — SIGNIFICANT CHANGE UP (ref 1.8–7.4)
NEUTROPHILS NFR BLD AUTO: 65.3 % — SIGNIFICANT CHANGE UP (ref 43–77)
PLATELET # BLD AUTO: 197 K/UL — SIGNIFICANT CHANGE UP (ref 150–400)
POTASSIUM SERPL-MCNC: 4.4 MMOL/L — SIGNIFICANT CHANGE UP (ref 3.5–5.3)
POTASSIUM SERPL-SCNC: 4.4 MMOL/L — SIGNIFICANT CHANGE UP (ref 3.5–5.3)
PROT SERPL-MCNC: 8.2 GM/DL — SIGNIFICANT CHANGE UP (ref 6–8.3)
RBC # BLD: 4.8 M/UL — SIGNIFICANT CHANGE UP (ref 3.8–5.2)
RBC # FLD: 12.4 % — SIGNIFICANT CHANGE UP (ref 10.3–14.5)
SARS-COV-2 RNA SPEC QL NAA+PROBE: SIGNIFICANT CHANGE UP
SODIUM SERPL-SCNC: 139 MMOL/L — SIGNIFICANT CHANGE UP (ref 135–145)
TROPONIN I SERPL-MCNC: <0.015 NG/ML — SIGNIFICANT CHANGE UP (ref 0.01–0.04)
WBC # BLD: 6.86 K/UL — SIGNIFICANT CHANGE UP (ref 3.8–10.5)
WBC # FLD AUTO: 6.86 K/UL — SIGNIFICANT CHANGE UP (ref 3.8–10.5)

## 2021-02-25 PROCEDURE — 80061 LIPID PANEL: CPT

## 2021-02-25 PROCEDURE — 93306 TTE W/DOPPLER COMPLETE: CPT

## 2021-02-25 PROCEDURE — 86769 SARS-COV-2 COVID-19 ANTIBODY: CPT

## 2021-02-25 PROCEDURE — 36415 COLL VENOUS BLD VENIPUNCTURE: CPT

## 2021-02-25 PROCEDURE — C1894: CPT

## 2021-02-25 PROCEDURE — 86803 HEPATITIS C AB TEST: CPT

## 2021-02-25 PROCEDURE — C1769: CPT

## 2021-02-25 PROCEDURE — C1887: CPT

## 2021-02-25 PROCEDURE — 93010 ELECTROCARDIOGRAM REPORT: CPT

## 2021-02-25 PROCEDURE — 93306 TTE W/DOPPLER COMPLETE: CPT | Mod: 26

## 2021-02-25 PROCEDURE — 99152 MOD SED SAME PHYS/QHP 5/>YRS: CPT

## 2021-02-25 PROCEDURE — 71045 X-RAY EXAM CHEST 1 VIEW: CPT | Mod: 26

## 2021-02-25 PROCEDURE — 83036 HEMOGLOBIN GLYCOSYLATED A1C: CPT

## 2021-02-25 PROCEDURE — 84443 ASSAY THYROID STIM HORMONE: CPT

## 2021-02-25 PROCEDURE — 99223 1ST HOSP IP/OBS HIGH 75: CPT

## 2021-02-25 PROCEDURE — 84484 ASSAY OF TROPONIN QUANT: CPT

## 2021-02-25 PROCEDURE — 93454 CORONARY ARTERY ANGIO S&I: CPT

## 2021-02-25 RX ORDER — ESCITALOPRAM OXALATE 10 MG/1
1 TABLET, FILM COATED ORAL
Qty: 0 | Refills: 0 | DISCHARGE

## 2021-02-25 RX ORDER — ASPIRIN/CALCIUM CARB/MAGNESIUM 324 MG
325 TABLET ORAL ONCE
Refills: 0 | Status: COMPLETED | OUTPATIENT
Start: 2021-02-25 | End: 2021-02-25

## 2021-02-25 RX ORDER — ATORVASTATIN CALCIUM 80 MG/1
40 TABLET, FILM COATED ORAL AT BEDTIME
Refills: 0 | Status: DISCONTINUED | OUTPATIENT
Start: 2021-02-25 | End: 2021-02-26

## 2021-02-25 RX ORDER — NITROGLYCERIN 6.5 MG
0.4 CAPSULE, EXTENDED RELEASE ORAL ONCE
Refills: 0 | Status: COMPLETED | OUTPATIENT
Start: 2021-02-25 | End: 2021-02-25

## 2021-02-25 RX ORDER — ESCITALOPRAM OXALATE 10 MG/1
10 TABLET, FILM COATED ORAL DAILY
Refills: 0 | Status: DISCONTINUED | OUTPATIENT
Start: 2021-02-25 | End: 2021-02-26

## 2021-02-25 RX ORDER — SERTRALINE 25 MG/1
1 TABLET, FILM COATED ORAL
Qty: 0 | Refills: 0 | DISCHARGE

## 2021-02-25 RX ORDER — HEPARIN SODIUM 5000 [USP'U]/ML
5000 INJECTION INTRAVENOUS; SUBCUTANEOUS EVERY 12 HOURS
Refills: 0 | Status: DISCONTINUED | OUTPATIENT
Start: 2021-02-25 | End: 2021-02-26

## 2021-02-25 RX ORDER — ASPIRIN/CALCIUM CARB/MAGNESIUM 324 MG
325 TABLET ORAL DAILY
Refills: 0 | Status: DISCONTINUED | OUTPATIENT
Start: 2021-02-25 | End: 2021-02-26

## 2021-02-25 RX ORDER — ROSUVASTATIN CALCIUM 5 MG/1
1 TABLET ORAL
Qty: 0 | Refills: 0 | DISCHARGE

## 2021-02-25 RX ADMIN — Medication 0.4 MILLIGRAM(S): at 12:23

## 2021-02-25 RX ADMIN — ATORVASTATIN CALCIUM 40 MILLIGRAM(S): 80 TABLET, FILM COATED ORAL at 21:03

## 2021-02-25 RX ADMIN — Medication 325 MILLIGRAM(S): at 13:10

## 2021-02-25 RX ADMIN — HEPARIN SODIUM 5000 UNIT(S): 5000 INJECTION INTRAVENOUS; SUBCUTANEOUS at 21:04

## 2021-02-25 NOTE — ED ADULT NURSE NOTE - CHIEF COMPLAINT
Patient requesting refill of escitalopram (LEXAPRO) 10 MG tablet    [facility]/pharmacy #[id number] - [address] [Patient Preferred]   [phone]    Patient does not want to use mail order anymore     The patient is a 55y Female complaining of chest pain.

## 2021-02-25 NOTE — ED STATDOCS - PHYSICAL EXAMINATION
Constitutional: Middle age female sitting in stretcher chair, NAD AAOx3  Eyes: PERRLA EOMI  Head: Normocephalic atraumatic  Mouth: MMM  Cardiac: regular rate   Resp: Lungs CTAB  GI: Abd s/nt/nd, no rebound or guarding.  Neuro: awake, alert, moving all extremities, cranial nerves 2-12 intact, sensation intact, no dysmetria.  Skin: No rashes

## 2021-02-25 NOTE — H&P ADULT - NSICDXPASTMEDICALHX_GEN_ALL_CORE_FT
PAST MEDICAL HISTORY:  History of alcoholism Ceased drinking 1996.    History of hypercholesterolemia not on medications at present    Menopausal bleeding post    Obesity     Snores

## 2021-02-25 NOTE — CONSULT NOTE ADULT - ASSESSMENT
Chest pain - currently asymptomatic.  Continue to trend serial cardiac enzymes and EKG  Admit to tele.  Will base further recommendations on the test results.     HTN- bp optimal now.    Other medical issues- Management per primary team.   Thank you for allowing me to participate in the care of this patient. Please feel free to contact me with any questions.

## 2021-02-25 NOTE — ED STATDOCS - CLINICAL SUMMARY MEDICAL DECISION MAKING FREE TEXT BOX
56 y/o F with HLD, overweight, +FHx of heart disease, with chest pressure radiating down L arm, began after walking her dog. will call cardiology, obtain blood work and reassess.

## 2021-02-25 NOTE — ED STATDOCS - OBJECTIVE STATEMENT
56 y/o F with a PMHx of HLD, pre-DM, presents to the ED c/o chest pain since 7am today. Pt states CP started after going on a walk with her dog. Describes pain as a pressure, "squeezing" and radiating down L arm. +mild dizziness and nausea. +FHx of heart disease. Pt f/u with cardiologist 6 years ago, had ECHO and stress test which were normal. Former smoker.

## 2021-02-25 NOTE — ED STATDOCS - NS ED ROS FT
Constitutional: No fever or chills  Eyes: No visual changes  HEENT: No throat pain  CV: +chest pain  Resp: No SOB no cough  GI: No abd pain, +nausea, no vomiting  : No dysuria  MSK: No musculoskeletal pain  Skin: No rash  Neuro: No headache, +dizziness

## 2021-02-25 NOTE — CONSULT NOTE ADULT - SUBJECTIVE AND OBJECTIVE BOX
Patient is a 55y old  Female who presents with a chief complaint of Chest pain.       HPI:  54 y/o female with hx of HLD, pre-diabetes, strong family hx of CAD/MI, Brother at age 48 and both parents MI at young age comes with cc of substernal chest pain 8/10 that began earlier today while walking her dog. Her Dog started to run causing he to excrete herself aneesh began with chest pain squeezing quality radiating to left neck with left arm numbness and dizziness. Denies any SOB, no palpitations no LOC or diaphoresis. In ED now pt was given nitro and helped pain now down to 2/10 scale per pt. Pt admitted for chest pain rule out ACS.     Pt currently denies any CP.        PAST MEDICAL & SURGICAL HISTORY:  Obesity    History of alcoholism  Ceased drinking .    Snores    History of hypercholesterolemia  not on medications at present    Menopausal bleeding  post    S/P shoulder surgery  left; 2012    History of bladder repair surgery  bladder sling;     S/P  section  X2; ,     S/P dilatation and curettage          MEDICATIONS  (STANDING):  aspirin 325 milliGRAM(s) Oral daily  atorvastatin 40 milliGRAM(s) Oral at bedtime  escitalopram 10 milliGRAM(s) Oral daily  heparin   Injectable 5000 Unit(s) SubCutaneous every 12 hours    MEDICATIONS  (PRN):      FAMILY HISTORY:  Family history of diabetes mellitus in father (Father)    Family history of myocardial infarction at age less than 60 (Sibling)  Brother    Family history of heart disease (Father)  Father    Family history of cerebrovascular accident (CVA) in mother (Mother)         SOCIAL HISTORY: non  smoker    REVIEW OF SYSTEMS:  CONSTITUTIONAL:    No fatigue, malaise, lethargy.  No fever or chills.  HEENT:  Eyes:  No visual changes.     ENT:  No epistaxis.  No sinus pain.    RESPIRATORY:  No cough.  No wheeze.  No hemoptysis.  No shortness of breath.  CARDIOVASCULAR:  c/o chest pains.  No palpitations. No shortness of breath, No orthopnea or PND.  GASTROINTESTINAL:  No abdominal pain.  No nausea or vomiting.    GENITOURINARY:    No hematuria.    MUSCULOSKELETAL:  c/o musculoskeletal pain.  No joint swelling.  No arthritis.  NEUROLOGICAL:  No tingling or numbness or weakness.  PSYCHIATRIC:  No confusion  SKIN:  No rashes.          Vital Signs Last 24 Hrs  T(C): 36.5 (2021 16:51), Max: 36.5 (2021 16:51)  T(F): 97.7 (2021 16:51), Max: 97.7 (2021 16:51)  HR: 97 (2021 16:51) (81 - 97)  BP: 147/81 (2021 16:51) (127/78 - 157/88)  BP(mean): 95 (2021 16:51) (95 - 95)  RR: 18 (2021 16:51) (18 - 18)  SpO2: 98% (2021 16:51) (98% - 98%)    PHYSICAL EXAM-    Constitutional: The patient appears to be normal, well developed, well nourished and alert and oriented to time, place and person. The patient does not appear acutely ill.     Head: Head is normocephalic and atraumatic.      Neck: No jugular venous distention. No audible carotid bruits. There are strong carotid pulses bilaterally. No JVD.     Cardiovascular: Regular rate and rhythm without S3, S4. No murmurs or rubs are appreciated.      Respiratory: Breathsounds are normal. No rales. No wheezing.    Abdomen: Soft, nontender, nondistended with positive bowel sounds.      Extremity: No tenderness. No  pitting edema     Neurologic: The patient is alert and oriented.      Skin: No rash, no obvious lesions noted.      Psychiatric: The patient appears to be emotionally stable.      INTERPRETATION OF TELEMETRY: sinus rythm    ECG: Sinus rythm , normal axis, no ST T changes.     I&O's Detail      LABS:                        14.0   6.86  )-----------( 197      ( 2021 12:11 )             41.4     02-25    139  |  105  |  14  ----------------------------<  99  4.4   |  27  |  0.82    Ca    9.5      2021 12:11    TPro  8.2  /  Alb  4.3  /  TBili  0.5  /  DBili  x   /  AST  25  /  ALT  36  /  AlkPhos  93  02-25    CARDIAC MARKERS ( 2021 14:58 )  <0.015 ng/mL / x     / x     / x     / x      CARDIAC MARKERS ( 2021 12:11 )  <0.015 ng/mL / x     / x     / x     / x              I&O's Summary    BNP  RADIOLOGY & ADDITIONAL STUDIES:  h< from: Xray Chest 1 View- PORTABLE-Urgent (21 @ 12:44) >    EXAM:  XR CHEST PORTABLE URGENT 1V                            PROCEDURE DATE:  2021          INTERPRETATION:  PROCEDURE: AP view of the chest.    CLINICAL INFORMATION: Chest pain.    COMPARISON: 2014.    FINDINGS:    Lungs: The lungs are clear.  Heart: The heart is normal in size.  Mediastinum: The mediastinum is within normal limits.    IMPRESSION:    Clear lungs.            REECE HOOKER MD; Attending Radiologist  This document has been electronically signed. 2021  1:19PM    < end of copied text >

## 2021-02-25 NOTE — H&P ADULT - NSICDXFAMILYHX_GEN_ALL_CORE_FT
FAMILY HISTORY:  Father  Still living? No  Family history of diabetes mellitus in father, Age at diagnosis: 71-80  Family history of heart disease, Age at diagnosis: 71-80    Mother  Still living? No  Family history of cerebrovascular accident (CVA) in mother, Age at diagnosis: 51-60    Sibling  Still living? No  Family history of myocardial infarction at age less than 60, Age at diagnosis: 41-50

## 2021-02-25 NOTE — H&P ADULT - NSICDXPASTSURGICALHX_GEN_ALL_CORE_FT
PAST SURGICAL HISTORY:  History of bladder repair surgery bladder sling; 2006    S/P  section X2; ,     S/P dilatation and curettage     S/P shoulder surgery left;

## 2021-02-25 NOTE — ED ADULT NURSE REASSESSMENT NOTE - NS ED NURSE REASSESS COMMENT FT1
patient resting comfortably. no needs at this time. will continue to monitor for safety and comfort.

## 2021-02-25 NOTE — H&P ADULT - NSHPLABSRESULTS_GEN_ALL_CORE
14.0   6.86  )-----------( 197      ( 25 Feb 2021 12:11 )             41.4       02-25    139  |  105  |  14  ----------------------------<  99  4.4   |  27  |  0.82    Ca    9.5      25 Feb 2021 12:11    TPro  8.2  /  Alb  4.3  /  TBili  0.5  /  DBili  x   /  AST  25  /  ALT  36  /  AlkPhos  93  02-25      CARDIAC MARKERS ( 25 Feb 2021 12:11 )  <0.015 ng/mL / x     / x     / x     / x        < from: Xray Chest 1 View- PORTABLE-Urgent (02.25.21 @ 12:44) >      EXAM:  XR CHEST PORTABLE URGENT 1V                            PROCEDURE DATE:  02/25/2021          INTERPRETATION:  PROCEDURE: AP view of the chest.    CLINICAL INFORMATION: Chest pain.    COMPARISON: 1/4/2014.    FINDINGS:    Lungs: The lungs are clear.  Heart: The heart is normal in size.  Mediastinum: The mediastinum is within normal limits.    IMPRESSION:    Clear lungs.            REECE HOOKER MD; Attending Radiologist  This document has been electronically signed. Feb 25 2021  1:19PM    < end of copied text >

## 2021-02-25 NOTE — ED ADULT NURSE NOTE - OBJECTIVE STATEMENT
Pt c/o chest pain started last night didn't sleep well ,pain  radiating to left arm .pt denies dizziness.

## 2021-02-25 NOTE — ED STATDOCS - PROGRESS NOTE DETAILS
cardiology consulted, will see patient later today, agrees with plan signed Shiloh Raymundo PA-C Pt seen and evaluated in intake by Dr Reynolds.   55F c/o chest pain "squeezing of my heart", nausea, diaphoresis, pain radiating to back and left shoudler starting at 7:15 after she had to run when her 170lb st candie pulled her while walking this morning. Pt developed symptoms after sitting down at home, lasted 4 hours, 8/10, improved to 2/10 after SL nitro in ED. PMH HTN, brother  of MI age 43. Last saw card 6 years ago but no card now. requests Dr Louis. Pt alert, NAD. recommend admission. Pt agrees with admission and plan of care. PMD Caporuscio.   No significant findings on labs, imaging, or EKG. signed Shiloh Raymundo PA-C   Case discussed with and admission accepted by hospitalist Dr. Reina. signed Shiloh Raymundo PA-C Pt seen and evaluated in intake by Dr Reynolds.   55F c/o chest pain "squeezing of my heart", nausea, diaphoresis, pain radiating to back and left shoudler starting at 7:15 after she had to run when her 170lb st candie pulled her while walking this morning. Pt developed symptoms after sitting down at home, lasted 4 hours, 8/10, improved to 2/10 after SL nitro in ED. PMH HTN, brother  of MI age 43. Last saw card 6 years ago but no card now.  Pt alert, NAD. recommend admission. Pt agrees with admission and plan of care. PMD Caporuscio.   No significant findings on labs, imaging, or EKG. signed DINORAH Laws Dr had spoken to card Dr Treviño earlier, discussed with pt who had mentioned card Dr Coker but pt is alright with seeing the on call doctor.

## 2021-02-25 NOTE — H&P ADULT - HISTORY OF PRESENT ILLNESS
56 y/o female with hx of HLD, pre-diabetes, strong family hx of CAD/MI, Brother at age 48 and both parents MI at young age comes with cc of substernal chest pain 8/10 that began earlier today while walking her dog. Her Dog started to run causing he to excrete herself aneesh began with chest pain squeezing quality radiating to left neck with left arm numbness and dizziness. Denies any SOB, no palpitations no LOC or diaphoresis. In ED now pt was given nitro and helped pain now down to 2/10 scale per pt. Pt admitted for chest pain rule out ACS.

## 2021-02-25 NOTE — H&P ADULT - ASSESSMENT
56 y/o female with hx of HLD, pre-diabetes, strong family hx of CAD/MI, Brother at age 48 and both parents MI at young age comes with cc of substernal chest pain 8/10 that began earlier today while walking her dog. Her Dog started to run causing he to excrete herself aneesh began with chest pain squeezing quality radiating to left neck with left arm numbness and dizziness. Denies any SOB, no palpitations no LOC or diaphoresis. In ED now pt was given nitro and helped pain now down to 2/10 scale per pt. Pt admitted for chest pain rule out ACS.    1) Chest pain with strong family hx of CAD/MI rule out ACS  - admit to tele  - 1st trop negative, serial trop ordered  - 2Decho ordered   - cardio Dr. Coker consulted  - check A1c, fasting lipid and TSH  - aspirin and statin    2) HLD  - on statin  - check lipid    3) Prediabetes  - check A1c level    4) Prophylactic measures  - DVT ppx: heparin SQ

## 2021-02-25 NOTE — ED ADULT TRIAGE NOTE - CHIEF COMPLAINT QUOTE
c/o constant worsening chest pain that radiates to L neck and L shoulder, accompanied with nausea and dizziness x4hrs. pt states she was walking her dog prior to episode. denies any SOB. pmhx HLD. Brother passed away from MI AT 43

## 2021-02-25 NOTE — ED STATDOCS - ATTENDING CONTRIBUTION TO CARE
I, Whit Reynolds MD, performed the initial face to face bedside interview with this patient regarding history of present illness, review of symptoms and relevant past medical, social and family history.  I completed an independent physical examination.  I was the initial provider who evaluated this patient. I have signed out the follow up of any pending tests (i.e. labs, radiological studies) to the ACP.  I have communicated the patient’s plan of care and disposition with the ACP.  The history, relevant review of systems, past medical and surgical history, medical decision making, and physical examination was documented by the scribe in my presence and I attest to the accuracy of the documentation.

## 2021-02-26 ENCOUNTER — TRANSCRIPTION ENCOUNTER (OUTPATIENT)
Age: 56
End: 2021-02-26

## 2021-02-26 VITALS
OXYGEN SATURATION: 100 % | DIASTOLIC BLOOD PRESSURE: 76 MMHG | SYSTOLIC BLOOD PRESSURE: 146 MMHG | RESPIRATION RATE: 18 BRPM | HEART RATE: 80 BPM

## 2021-02-26 LAB
A1C WITH ESTIMATED AVERAGE GLUCOSE RESULT: 5.9 % — HIGH (ref 4–5.6)
CHOLEST SERPL-MCNC: 181 MG/DL — SIGNIFICANT CHANGE UP
ESTIMATED AVERAGE GLUCOSE: 123 MG/DL — HIGH (ref 68–114)
HCV AB S/CO SERPL IA: 0.16 S/CO — SIGNIFICANT CHANGE UP (ref 0–0.99)
HCV AB SERPL-IMP: SIGNIFICANT CHANGE UP
HDLC SERPL-MCNC: 44 MG/DL — LOW
LIPID PNL WITH DIRECT LDL SERPL: 110 MG/DL — HIGH
NON HDL CHOLESTEROL: 137 MG/DL — HIGH
SARS-COV-2 IGG SERPL QL IA: NEGATIVE — SIGNIFICANT CHANGE UP
SARS-COV-2 IGM SERPL IA-ACNC: <0.1 INDEX — SIGNIFICANT CHANGE UP
TRIGL SERPL-MCNC: 135 MG/DL — SIGNIFICANT CHANGE UP
TSH SERPL-MCNC: 0.63 UU/ML — SIGNIFICANT CHANGE UP (ref 0.34–4.82)

## 2021-02-26 PROCEDURE — 99239 HOSP IP/OBS DSCHRG MGMT >30: CPT

## 2021-02-26 RX ORDER — SERTRALINE 25 MG/1
100 TABLET, FILM COATED ORAL DAILY
Refills: 0 | Status: DISCONTINUED | OUTPATIENT
Start: 2021-02-27 | End: 2021-02-26

## 2021-02-26 RX ORDER — ASPIRIN/CALCIUM CARB/MAGNESIUM 324 MG
1 TABLET ORAL
Qty: 30 | Refills: 0
Start: 2021-02-26 | End: 2021-03-27

## 2021-02-26 RX ORDER — PANTOPRAZOLE SODIUM 20 MG/1
1 TABLET, DELAYED RELEASE ORAL
Qty: 30 | Refills: 0
Start: 2021-02-26 | End: 2021-03-27

## 2021-02-26 RX ADMIN — ESCITALOPRAM OXALATE 10 MILLIGRAM(S): 10 TABLET, FILM COATED ORAL at 08:59

## 2021-02-26 RX ADMIN — Medication 325 MILLIGRAM(S): at 08:59

## 2021-02-26 NOTE — DISCHARGE NOTE PROVIDER - NSDCCPCAREPLAN_GEN_ALL_CORE_FT
PRINCIPAL DISCHARGE DIAGNOSIS  Diagnosis: Chest pain  Assessment and Plan of Treatment:   # Chest pain r/o ACS vs GERD vs. MSK  - LHC reveals non obstructive CAD  - TTE - normal   - asa 81 mg po daily  - cont. crestor  - trial of PPI if chest pain resturns   - follow up post cath instructions  - follow up with cardiology - Dr. Coker next week

## 2021-02-26 NOTE — CHART NOTE - NSCHARTNOTEFT_GEN_A_CORE
Patient is a 55y old  Female who presents with a chief complaint of Chest pain after walking the dog. Strong family hx of MI with sudden death.     -Consent obtained for cardiac catheterization w/ coronary angiogram and possible stent placement. Pt is competent, has capacity, and understands risks and benefits of procedure. Risks and benefits discussed. Risk discussed included, but not limited to MI, stroke, mortality, major bleeding, arrythmia, or infection. All questions answered     ASA class: II  Creatinine: 0.82  GFR: 83  Bleeding  Risk score: 1.3 %

## 2021-02-26 NOTE — PROGRESS NOTE ADULT - ASSESSMENT
A/P: 54 y/o female with hx of HLD, pre-diabetes, strong family hx of CAD/MI, Brother at age 48 and both parents MI at young age comes with cc of substernal chest pain 8/10 that began earlier today while walking her dog.    1. Unstable angina. Pt with typical CP and significant risk factors- will have LHC today.   Cont to follow on tele.   Cont asa, statin. BP stable.     2. Check hgbA1c, fasting lipid panel.     3. DVT proph. F/u with me next week upon d/c.

## 2021-02-26 NOTE — DISCHARGE NOTE PROVIDER - NSDCMRMEDTOKEN_GEN_ALL_CORE_FT
aspirin 81 mg oral delayed release tablet: 1 tab(s) orally once a day   Crestor 10 mg oral tablet: 1 tab(s) orally once a day  pantoprazole 40 mg oral delayed release tablet: 1 tab(s) orally once a day   sertraline 100 mg oral tablet: 1 tab(s) orally once a day

## 2021-02-26 NOTE — DISCHARGE NOTE NURSING/CASE MANAGEMENT/SOCIAL WORK - PATIENT PORTAL LINK FT
You can access the FollowMyHealth Patient Portal offered by [facility] by registering at the following website: [email or web address]. By joining [facility]’s FollowMyHealth portal, you will also be able to view your health information using other applications (apps) compatible with our system.

## 2021-02-26 NOTE — PROGRESS NOTE ADULT - SUBJECTIVE AND OBJECTIVE BOX
Cardiology Consultation    HPI: 56 y/o female with hx of HLD, pre-diabetes, strong family hx of CAD/MI, Brother at age 48 and both parents MI at young age comes with cc of substernal chest pain /10 that began earlier today while walking her dog. Her Dog started to run causing he to excrete herself aneesh began with chest pain squeezing quality radiating to left neck with left arm numbness and dizziness. Denies any SOB, no palpitations no LOC or diaphoresis. In ED now pt was given nitro and helped pain now down to 2/10 scale per pt. Pt admitted for chest pain rule out ACS. (2021 13:30)    . +CP with radiation yesterday. Resolved with SL NTG.  +exertional symptoms. Strong fam hx of early onset CAD.  No CP at present. SR on tele. No events last pm.  Poor medical f/u.    PAST MEDICAL & SURGICAL HISTORY:  Obesity  History of alcoholism  Ceased drinking .  History of hypercholesterolemia  not on medications at present  Menopausal bleeding  post  S/P shoulder surgery  left; 2012  History of bladder repair surgery  bladder sling; 2006  S/P  section  X2; ,   S/P dilatation and curettage  2015    Allergies  Allergy Status Unknown    Intolerances  statins (Other)    SOCIAL HISTORY: Denies tobacco, etoh abuse or illicit drug use    FAMILY HISTORY:  Family history of diabetes mellitus in father (Father)  Family history of myocardial infarction at age less than 60 (Sibling)  Brother  Family history of heart disease (Father)  Father  Family history of cerebrovascular accident (CVA) in mother (Mother)    MEDICATIONS  (STANDING):  aspirin 325 milliGRAM(s) Oral daily  atorvastatin 40 milliGRAM(s) Oral at bedtime  escitalopram 10 milliGRAM(s) Oral daily  heparin   Injectable 5000 Unit(s) SubCutaneous every 12 hours    MEDICATIONS  (PRN):    Vital Signs Last 24 Hrs  T(C): 37 (2021 08:14), Max: 37.4 (2021 19:46)  T(F): 98.6 (2021 08:14), Max: 99.3 (2021 19:46)  HR: 79 (2021 08:14) (79 - 97)  BP: 121/61 (2021 08:14) (121/61 - 157/88)  BP(mean): 95 (2021 16:51) (95 - 95)  RR: 18 (2021 08:14) (17 - 18)  SpO2: 98% (2021 08:14) (98% - 98%)    REVIEW OF SYSTEMS:    CONSTITUTIONAL:  As per HPI.  HEENT:  Eyes:  No diplopia or blurred vision. ENT:  No earache, sore throat or runny nose.  CARDIOVASCULAR:  +exertional CP  RESPIRATORY:  No cough, shortness of breath, PND or orthopnea.  GASTROINTESTINAL:  No nausea, vomiting or diarrhea.  GENITOURINARY:  No dysuria, frequency or urgency.  MUSCULOSKELETAL:  As per HPI.  SKIN:  No change in skin, hair or nails.  NEUROLOGIC:  No paresthesias, fasciculations, seizures or weakness.    PHYSICAL EXAMINATION:    GENERAL APPEARANCE:  Pt. is not currently dyspneic, in no distress. Pt. is alert, oriented, and pleasant.  HEENT:  Pupils are normal and react normally. No icterus. Mucous membranes well colored.  NECK:  Supple. No lymphadenopathy. Jugular venous pressure not elevated. Carotids equal.   HEART:   The cardiac impulse has a normal quality. There are no murmurs, rubs or gallops noted  CHEST:  Chest is clear to auscultation. Normal respiratory effort.  ABDOMEN:  Soft and nontender.   EXTREMITIES:  There is no edema.     LABS:                        14.0   6.86  )-----------( 197      ( 2021 12:11 )             41.4     -    139  |  105  |  14  ----------------------------<  99  4.4   |  27  |  0.82    Ca    9.5      2021 12:11    TPro  8.2  /  Alb  4.3  /  TBili  0.5  /  DBili  x   /  AST  25  /  ALT  36  /  AlkPhos  93      LIVER FUNCTIONS - ( 2021 12:11 )  Alb: 4.3 g/dL / Pro: 8.2 gm/dL / ALK PHOS: 93 U/L / ALT: 36 U/L / AST: 25 U/L / GGT: x           CARDIAC MARKERS ( 2021 17:32 )  <0.015 ng/mL / x     / x     / x     / x      CARDIAC MARKERS ( 2021 14:58 )  <0.015 ng/mL / x     / x     / x     / x      CARDIAC MARKERS ( 2021 12:11 )  <0.015 ng/mL / x     / x     / x     / x        EKG: SR    TELEMETRY: < from: 12 Lead ECG (21 @ 11:38) >  Normal sinus rhythm  Possible Left atrial enlargement  Borderline ECG    CARDIAC TESTS: < from: TTE Echo Complete w/o Contrast w/ Doppler (21 @ 14:28) >   Normal appearing left atrium.   The left ventricle is normal in size, wall thickness, wall motion and   contractility.   Estimated left ventricular ejection fraction is 60-65 %.   Normal appearing mitral valve structure and function.   Mild (1+) aortic regurgitation is present.    RADIOLOGY & ADDITIONAL STUDIES:   < from: Xray Chest 1 View- PORTABLE-Urgent (21 @ 12:44) >  Lungs: The lungs are clear.  Heart: The heart is normal in size.  Mediastinum: The mediastinum is within normal limits.    ASSESSMENT & PLAN:
Nurse Practitioner Progress note:     HPI:  54 y/o female with hx of HLD, pre-diabetes, strong family hx of CAD/MI, Brother at age 48 and both parents MI at young age comes with cc of substernal chest pain 8/10 that began earlier today while walking her dog. for [facility] today     T(C): 36.7 (02-26-21 @ 11:45), Max: 37.4 (02-25-21 @ 19:46)  HR: 77 (02-26-21 @ 12:25) (77 - 97)  BP: 130/74 (02-26-21 @ 12:25) (121/61 - 155/75)  RR: 16 (02-26-21 @ 12:25) (16 - 18)  SpO2: 98% (02-26-21 @ 12:25) (98% - 99%)        PHYSICAL EXAM:  NEURO: Non-focal, AxOx3.  No neuro deficits NECK: Supple, No JVD, No LAD  CHEST/LUNG: Clear to auscultation bilaterally; No wheeze  HEART: s1 s2 Regular rate and rhythm; No murmurs, rubs, or gallops  ABDOMEN: Soft, Nontender, Nondistended; Bowel sounds present X 4 quadrants   EXTREMITIES:  2+ Peripheral Pulses, No clubbing, cyanosis, or edema   VASCULAR: Peripheral pulses palpable 2+ bilaterally  PROCEDURE SITE: RFA accessed, sheath pulled in recovery room. Site is without hematoma or bleeding. Sensation and SWETA intact. Distal pulses palpable 2+, capillary refill < 2 seconds. Patient denies pain, numbness, tingling, CP or SOB. Clean dry dressing applied     PROCEDURE RESULTS: < from: Cardiac Cath Lab - Adult (02.26.21 @ 12:15) >   Diagnostic Conclusions     Non-Obstructive CAD.     Recommendations     Aggressive medical management of coronaryartery disease and its   underlying risk factors.   Manage with medical therapy.      < end of copied text >      ASSESSMENT  54 y/o female with hx of HLD, pre-diabetes, strong family hx of CAD/MI, Brother at age 48 and both parents MI at young age comes with cc of substernal chest pain 8/10 that began earlier today while walking her dog.  Pt admitted for chest pain rule out ACS. (25 Feb 2021 13:30)    s/p LHC revealing non obstructive CAD    PLAN:  -VS, diet, activity as per post cath orders  -Encourage PO fluids  -Continue current medications  -Plan of care discussed with patient and Polena  -Post cath instructions reviewed, patient verbalizes and understands instructions  - rest of care per primary team

## 2021-02-26 NOTE — DISCHARGE NOTE PROVIDER - NSDCFUADDINST_GEN_ALL_CORE_FT
- follow up post cath instructions - no lifting more than 10 lbs for 7 days, no running, exertional activity for 7 days, no bathing, swimming for 7 days, can shower, do not rub right groin access site, pat dry, if you notice any swelling, call PMD or  ER, if bleeding apply pressure, if bleeding does not stop call ambulance

## 2021-02-26 NOTE — PACU DISCHARGE NOTE - COMMENTS
report given to RN on 3N. tele monitor confirmed. Right groin drsg CDI. soft, no evidence of hematoma. no bleeding noted. vital signs stable. bedrest until 1435.

## 2021-02-26 NOTE — DISCHARGE NOTE PROVIDER - CARE PROVIDER_API CALL
Ignacio Coker (DO)  Cardiology  [address] [place] [address]  Phone: [phone]  Fax: [phone]  Follow Up Time:

## 2021-02-26 NOTE — DISCHARGE NOTE PROVIDER - HOSPITAL COURSE
56 y/o female with hx of HLD, pre-diabetes, strong family hx of CAD/MI, p/w chest pain, now resolved     # Chest pain r/o ACS vs GERD vs. MSK  - LHC reveals non obstructive CAD  - TTE - normal   - asa 81 mg po daily  - cont. crestor  - trial of PPI if chest pain resturns   - follow up post cath instructions  - follow up with cardiology - Dr. Coker next week    # HLD  - cont. crestor    Pt iis stable for discahrge, will follow up with carduology - Dr. Coker, next week     PHYSICAL EXAM:  Vital Signs Last 24 Hrs  T(C): 36.7 (26 Feb 2021 11:45), Max: 37.4 (25 Feb 2021 19:46)  T(F): 98 (26 Feb 2021 11:45), Max: 99.3 (25 Feb 2021 19:46)  HR: 80 (26 Feb 2021 14:00) (73 - 97)  BP: 146/76 (26 Feb 2021 14:00) (117/78 - 155/75)  BP(mean): 95 (25 Feb 2021 16:51) (95 - 95)  RR: 18 (26 Feb 2021 14:00) (16 - 18)  SpO2: 100% (26 Feb 2021 14:00) (98% - 100%)  NEURO: Non-focal, AxOx3.  No neuro deficits NECK: Supple, No JVD, No LAD  CHEST/LUNG: Clear to auscultation bilaterally; No wheeze  HEART: s1 s2 Regular rate and rhythm; No murmurs, rubs, or gallops  ABDOMEN: Soft, Nontender, Nondistended; Bowel sounds present X 4 quadrants   EXTREMITIES:  2+ Peripheral Pulses, No clubbing, cyanosis, or edema   VASCULAR: Peripheral pulses palpable 2+ bilaterally  PROCEDURE SITE: RFA accessed, sheath pulled in recovery room. Site is without hematoma or bleeding. Sensation and SWETA intact. Distal pulses palpable 2+, capillary refill < 2 seconds.   Patient denies pain, numbness, tingling, CP or SOB. Clean dry dressing applied

## 2021-02-26 NOTE — PHARMACOTHERAPY INTERVENTION NOTE - COMMENTS
Completed medication history with the patient and Dr First MedHx. Patient initially stated she takes Lexapro but later recanted and stated she takes sertraline 100mg daily after she saw the Dr First Burrows report.
Recommended continuing home dose sertraline

## 2021-03-02 DIAGNOSIS — I25.110 ATHEROSCLEROTIC HEART DISEASE OF NATIVE CORONARY ARTERY WITH UNSTABLE ANGINA PECTORIS: ICD-10-CM

## 2021-03-02 DIAGNOSIS — Z82.49 FAMILY HISTORY OF ISCHEMIC HEART DISEASE AND OTHER DISEASES OF THE CIRCULATORY SYSTEM: ICD-10-CM

## 2021-03-02 DIAGNOSIS — R06.83 SNORING: ICD-10-CM

## 2021-03-02 DIAGNOSIS — F10.21 ALCOHOL DEPENDENCE, IN REMISSION: ICD-10-CM

## 2021-03-02 DIAGNOSIS — I10 ESSENTIAL (PRIMARY) HYPERTENSION: ICD-10-CM

## 2021-03-02 DIAGNOSIS — E78.5 HYPERLIPIDEMIA, UNSPECIFIED: ICD-10-CM

## 2021-03-02 DIAGNOSIS — Z82.3 FAMILY HISTORY OF STROKE: ICD-10-CM

## 2021-03-02 DIAGNOSIS — Z88.8 ALLERGY STATUS TO OTHER DRUGS, MEDICAMENTS AND BIOLOGICAL SUBSTANCES STATUS: ICD-10-CM

## 2021-03-02 DIAGNOSIS — R73.03 PREDIABETES: ICD-10-CM

## 2021-03-02 DIAGNOSIS — R07.9 CHEST PAIN, UNSPECIFIED: ICD-10-CM

## 2021-03-02 DIAGNOSIS — Z87.891 PERSONAL HISTORY OF NICOTINE DEPENDENCE: ICD-10-CM

## 2021-03-02 DIAGNOSIS — Z83.3 FAMILY HISTORY OF DIABETES MELLITUS: ICD-10-CM

## 2021-03-02 DIAGNOSIS — E66.9 OBESITY, UNSPECIFIED: ICD-10-CM

## 2021-06-07 NOTE — PATIENT PROFILE ADULT - NSTRANSFEREYEGLASSESPAIRS_GEN_A_NUR
[de-identified] : May 20, 2021 sinus rhythm borderline IVCD with poor R progression [de-identified] : 2016 [de-identified] : 2016 normal LV function [de-identified] : November 20 1630% stenosis no obstructive disease 1 pair

## 2022-03-21 ENCOUNTER — NON-APPOINTMENT (OUTPATIENT)
Age: 57
End: 2022-03-21

## 2022-04-11 ENCOUNTER — APPOINTMENT (OUTPATIENT)
Dept: OBGYN | Facility: CLINIC | Age: 57
End: 2022-04-11
Payer: COMMERCIAL

## 2022-04-11 VITALS
BODY MASS INDEX: 31.84 KG/M2 | WEIGHT: 215 LBS | SYSTOLIC BLOOD PRESSURE: 120 MMHG | DIASTOLIC BLOOD PRESSURE: 70 MMHG | HEIGHT: 69 IN

## 2022-04-11 PROCEDURE — 99396 PREV VISIT EST AGE 40-64: CPT

## 2022-04-11 RX ORDER — ESTRADIOL 10 UG/1
10 TABLET VAGINAL
Qty: 24 | Refills: 1 | Status: COMPLETED | COMMUNITY
Start: 2018-12-21 | End: 2020-01-11

## 2022-04-11 RX ORDER — ESTRADIOL 0.04 MG/D
0.04 PATCH, EXTENDED RELEASE TRANSDERMAL
Qty: 24 | Refills: 1 | Status: COMPLETED | COMMUNITY
Start: 2018-12-21 | End: 2020-01-11

## 2022-04-11 NOTE — PHYSICAL EXAM
[Appropriately responsive] : appropriately responsive [Alert] : alert [No Acute Distress] : no acute distress [No Lymphadenopathy] : no lymphadenopathy [No Murmurs] : no murmurs [Soft] : soft [Non-tender] : non-tender [Non-distended] : non-distended [No HSM] : No HSM [No Lesions] : no lesions [No Mass] : no mass [Oriented x3] : oriented x3 [FreeTextEntry6] : Chronic inversion on left with surgery for correction about  [Examination Of The Breasts] : a normal appearance [Breast Nipple Inversion Left] : inverted [No Masses] : no breast masses were palpable

## 2022-04-11 NOTE — HISTORY OF PRESENT ILLNESS
[FreeTextEntry1] : 57 yo  with LMP with Hysterectomy \par \par s/p ERT for about 18 months.\par Since 2019 off all ERT.\par \par 3/23 Mammo stated 4a, with biopsy dx breat cancer.\par PT will have MRI this week.\par 5/3/22 consult with MSK \par \par Med:\par DM - Dr Barroso. Has been trying to lose wt.\par \par SH:  together with partner, sober 20+ years.\par Daughter at [place] in pre [place].\par \par

## 2022-04-15 ENCOUNTER — EMERGENCY (EMERGENCY)
Facility: HOSPITAL | Age: 57
LOS: 0 days | Discharge: HOME | End: 2022-04-15
Attending: EMERGENCY MEDICINE
Payer: COMMERCIAL

## 2022-04-15 VITALS
OXYGEN SATURATION: 95 % | SYSTOLIC BLOOD PRESSURE: 128 MMHG | RESPIRATION RATE: 18 BRPM | TEMPERATURE: 98 F | HEART RATE: 68 BPM | DIASTOLIC BLOOD PRESSURE: 64 MMHG

## 2022-04-15 VITALS — HEIGHT: 69 IN | WEIGHT: 199.96 LBS

## 2022-04-15 DIAGNOSIS — E66.9 OBESITY, UNSPECIFIED: ICD-10-CM

## 2022-04-15 DIAGNOSIS — S30.0XXA CONTUSION OF LOWER BACK AND PELVIS, INITIAL ENCOUNTER: ICD-10-CM

## 2022-04-15 DIAGNOSIS — Z88.8 ALLERGY STATUS TO OTHER DRUGS, MEDICAMENTS AND BIOLOGICAL SUBSTANCES STATUS: ICD-10-CM
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
## 2022-04-15 DIAGNOSIS — M54.50 LOW BACK PAIN, UNSPECIFIED: ICD-10-CM

## 2022-04-15 DIAGNOSIS — M79.18 MYALGIA, OTHER SITE: ICD-10-CM

## 2022-04-15 DIAGNOSIS — Z98.890 OTHER SPECIFIED POSTPROCEDURAL STATES: Chronic | ICD-10-CM
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
## 2022-04-15 DIAGNOSIS — Z98.891 HISTORY OF UTERINE SCAR FROM PREVIOUS SURGERY: Chronic | ICD-10-CM

## 2022-04-15 DIAGNOSIS — E78.00 PURE HYPERCHOLESTEROLEMIA, UNSPECIFIED: ICD-10-CM

## 2022-04-15 DIAGNOSIS — E78.5 HYPERLIPIDEMIA, UNSPECIFIED: ICD-10-CM

## 2022-04-15 DIAGNOSIS — Z79.82 LONG TERM (CURRENT) USE OF ASPIRIN: ICD-10-CM

## 2022-04-15 PROCEDURE — 72131 CT LUMBAR SPINE W/O DYE: CPT | Mod: MA

## 2022-04-15 PROCEDURE — 99284 EMERGENCY DEPT VISIT MOD MDM: CPT

## 2022-04-15 PROCEDURE — 99284 EMERGENCY DEPT VISIT MOD MDM: CPT | Mod: 25

## 2022-04-15 PROCEDURE — 72131 CT LUMBAR SPINE W/O DYE: CPT | Mod: 26,MA

## 2022-04-15 RX ORDER — OXYCODONE AND ACETAMINOPHEN 5; 325 MG/1; MG/1
1 TABLET ORAL ONCE
Refills: 0 | Status: DISCONTINUED | OUTPATIENT
Start: 2022-04-15 | End: 2022-04-15

## 2022-04-15 NOTE — ED PROVIDER NOTE - NS ED ROS FT
Constitutional: No fever or chills  Eyes: No visual changes  HEENT: No throat pain  CV: No chest pain  Resp: No SOB no cough  GI: No abd pain, nausea or vomiting  : No dysuria  MSK: + buttocks pain, back pain   Skin: No rash  Neuro: No headache

## 2022-04-15 NOTE — ED PROVIDER NOTE - CLINICAL SUMMARY MEDICAL DECISION MAKING FREE TEXT BOX
CT lumbar obtained, CT lumbar obtained and did not reveal any acute process, I discussed the results with the patient, given f/u, return precautions, agreeable with plan of care and dc in stable condition.

## 2022-04-15 NOTE — ED PROVIDER NOTE - PHYSICAL EXAMINATION
Constitutional: NAD AAOx3  Eyes: PERRLA EOMI  Head: Normocephalic atraumatic  Mouth: MMM  Cardiac: regular rate   Resp: Lungs CTAB  GI: Abd s/nt/nd  Neuro: CN2-12 intact  Extremities: Intact distal pulses b/l, no calf tenderness, normal ROM b/l UE and LE   Back: no cervical tenderness, no thoracic tenderness, + low lumbar / sacral TTP  Skin: No rashes

## 2022-04-15 NOTE — ED ADULT NURSE NOTE - OBJECTIVE STATEMENT
Pt reports slip and fall on wooden steps landing onto her sacral area.  Denies LOC or dizziness. Pt reports that she has cont to have sacral pain temporarily managed with ice and Tylenol, but persistent. Denies any focal weakness or incontinence.

## 2022-04-15 NOTE — ED ADULT TRIAGE NOTE - CHIEF COMPLAINT QUOTE
pt p/w c/o sacrum pain s/p fall on Wednesday down 7 steps (mechanical trip and fall).  Pt states she has severe 10/10 pain in her sacral region and R elbow pain.

## 2022-04-15 NOTE — ED PROVIDER NOTE - PATIENT PORTAL LINK FT
You can access the FollowMyHealth Patient Portal offered by [facility] by registering at the following website: [email or web address]. By joining [facility]’s FollowMyHealth portal, you will also be able to view your health information using other applications (apps) compatible with our system.

## 2022-04-15 NOTE — ED PROVIDER NOTE - OBJECTIVE STATEMENT
56 F hx HLD and pre-diabetes here s/p fall down stairs 2 days ago.  pt states she had just taken her dog out so her slippers were wet causing her to slip down the stairs. denies feeling lightheaded or dizzy prior to fall.  pt here c/o buttocks pain and lower back pain. pt  describes the pain as  a "fire".  reports getting up from sitting position or movement exacerbates the pain. pt denies abdominal pain, chest pain, shortness of breath. no urinary or bowel incontinence. denies radiation of pain down b/l legs. reports she has been able to ambulate s/p fall. no head injury. no LOC. not on any blood thinners. pt states she has been icing buttocks and taking Tylenol to manage the pain. pt reports she did not come to hospital 2 days ago s/p fall since she was scheduled to have MRI of breast; reports she was dx with breast cancer 2 weeks ago (not on chemo yet).

## 2022-05-19 DIAGNOSIS — C50.512 MALIGNANT NEOPLASM OF LOWER-OUTER QUADRANT OF LEFT FEMALE BREAST: ICD-10-CM

## 2022-05-19 DIAGNOSIS — Z17.0 MALIGNANT NEOPLASM OF LOWER-OUTER QUADRANT OF LEFT FEMALE BREAST: ICD-10-CM

## 2022-10-03 NOTE — ASU PATIENT PROFILE, ADULT - AS SC BRADEN NUTRITION
Trent Recinos called for record of EKG rn gives to dr. Yvonne Dong for reveiw     Nusrat Desai RN  10/03/22 [id number]
(3) adequate

## 2023-07-13 ENCOUNTER — APPOINTMENT (OUTPATIENT)
Dept: FAMILY MEDICINE | Facility: CLINIC | Age: 58
End: 2023-07-13
Payer: COMMERCIAL

## 2023-07-13 VITALS
OXYGEN SATURATION: 98 % | WEIGHT: 223 LBS | DIASTOLIC BLOOD PRESSURE: 82 MMHG | HEART RATE: 95 BPM | BODY MASS INDEX: 33.03 KG/M2 | SYSTOLIC BLOOD PRESSURE: 126 MMHG | TEMPERATURE: 97.3 F | HEIGHT: 69 IN

## 2023-07-13 DIAGNOSIS — R73.03 PREDIABETES.: ICD-10-CM

## 2023-07-13 DIAGNOSIS — Z86.59 PERSONAL HISTORY OF OTHER MENTAL AND BEHAVIORAL DISORDERS: ICD-10-CM

## 2023-07-13 DIAGNOSIS — E78.5 HYPERLIPIDEMIA, UNSPECIFIED: ICD-10-CM

## 2023-07-13 DIAGNOSIS — Z00.00 ENCOUNTER FOR GENERAL ADULT MEDICAL EXAMINATION W/OUT ABNORMAL FINDINGS: ICD-10-CM

## 2023-07-13 DIAGNOSIS — Z85.3 PERSONAL HISTORY OF MALIGNANT NEOPLASM OF BREAST: ICD-10-CM

## 2023-07-13 DIAGNOSIS — R07.89 OTHER CHEST PAIN: ICD-10-CM

## 2023-07-13 DIAGNOSIS — Z87.898 PERSONAL HISTORY OF OTHER SPECIFIED CONDITIONS: ICD-10-CM

## 2023-07-13 DIAGNOSIS — I10 ESSENTIAL (PRIMARY) HYPERTENSION: ICD-10-CM

## 2023-07-13 DIAGNOSIS — Z12.39 ENCOUNTER FOR OTHER SCREENING FOR MALIGNANT NEOPLASM OF BREAST: ICD-10-CM

## 2023-07-13 DIAGNOSIS — Z92.89 PERSONAL HISTORY OF OTHER MEDICAL TREATMENT: ICD-10-CM

## 2023-07-13 DIAGNOSIS — N95.2 POSTMENOPAUSAL ATROPHIC VAGINITIS: ICD-10-CM

## 2023-07-13 PROCEDURE — 99386 PREV VISIT NEW AGE 40-64: CPT | Mod: 25

## 2023-07-13 PROCEDURE — G0447 BEHAVIOR COUNSEL OBESITY 15M: CPT

## 2023-07-13 RX ORDER — CHROMIUM 200 MCG
TABLET ORAL
Refills: 0 | Status: ACTIVE | COMMUNITY

## 2023-07-13 RX ORDER — BUPROPION HYDROCHLORIDE 75 MG/1
75 TABLET, FILM COATED ORAL
Refills: 0 | Status: DISCONTINUED | COMMUNITY
End: 2023-07-13

## 2023-07-13 RX ORDER — ASPIRIN 81 MG
81 TABLET, DELAYED RELEASE (ENTERIC COATED) ORAL
Refills: 0 | Status: ACTIVE | COMMUNITY

## 2023-07-13 RX ORDER — SERTRALINE HYDROCHLORIDE 50 MG/1
50 TABLET, FILM COATED ORAL DAILY
Qty: 90 | Refills: 0 | Status: DISCONTINUED | COMMUNITY
End: 2023-07-13

## 2023-07-13 RX ORDER — EXEMESTANE 25 MG/1
25 TABLET, FILM COATED ORAL
Refills: 0 | Status: ACTIVE | COMMUNITY

## 2023-07-13 RX ORDER — LOSARTAN POTASSIUM 25 MG/1
25 TABLET, FILM COATED ORAL
Refills: 0 | Status: ACTIVE | COMMUNITY

## 2023-07-13 RX ORDER — HYDROCHLOROTHIAZIDE 25 MG/1
25 TABLET ORAL
Refills: 0 | Status: ACTIVE | COMMUNITY

## 2023-07-13 NOTE — HEALTH RISK ASSESSMENT
[No] : In the past 12 months have you used drugs other than those required for medical reasons? No [0] : 2) Feeling down, depressed, or hopeless: Not at all (0) [PHQ-2 Negative - No further assessment needed] : PHQ-2 Negative - No further assessment needed [Former] : Former [Excellent] : ~his/her~  mood as  excellent [Patient reported mammogram was normal] : Patient reported mammogram was normal [Patient reported bone density results were normal] : Patient reported bone density results were normal [Patient reported colonoscopy was normal] : Patient reported colonoscopy was normal [Employed] : employed [] :  [# Of Children ___] : has [unfilled] children [Fully functional (bathing, dressing, toileting, transferring, walking, feeding)] : Fully functional (bathing, dressing, toileting, transferring, walking, feeding) [Fully functional (using the telephone, shopping, preparing meals, housekeeping, doing laundry, using] : Fully functional and needs no help or supervision to perform IADLs (using the telephone, shopping, preparing meals, housekeeping, doing laundry, using transportation, managing medications and managing finances) [20 or more] : 20 or more [None] : None [With Family] : lives with family [Audit-CScore] : 0 [de-identified] : Walking [de-identified] : Fair [TWT8Ankwx] : 0 [LowDoseCTScan] : 09/2022 [Reports changes in hearing] : Reports no changes in hearing [Reports changes in vision] : Reports no changes in vision [Reports changes in dental health] : Reports no changes in dental health [MammogramDate] : 03/22 [MammogramComments] : Pt had a double mastectomy [PapSmearComments] : Pt had a full hysterectomy [BoneDensityDate] : 2021 [ColonoscopyDate] : 2021 [ColonoscopyComments] : rpt 2026 [de-identified] :  [de-identified] : UTD with dentist and vision [de-identified] : 22 years quit 2 packs a day

## 2023-07-13 NOTE — COUNSELING
[Potential consequences of obesity discussed] : Potential consequences of obesity discussed [Benefits of weight loss discussed] : Benefits of weight loss discussed [Target Wt Loss Goal ___] : Weight Loss Goals: Target weight loss goal [unfilled] lbs [Good understanding] : Patient has a good understanding of disease, goals and obesity follow-up plan [FreeTextEntry2] : Intermittent fasting [FreeTextEntry4] : 5

## 2023-07-13 NOTE — HISTORY OF PRESENT ILLNESS
[FreeTextEntry1] : Cardio- dr wright\par ONC- [facility], \par GYN- Dr Braun\par surgery-[facility] [de-identified] : 57 Yo F, new patient presents for CPE and establish care\par PMH breast cancer, total hysterectomy, HTN, HLD, prediabetes, and obesity.\par \par Feeling well, no complaints today\par \par Former smoker- LDCT  at [facility] 9/2022, 3mm nodule

## 2023-09-15 NOTE — H&P ADULT - MINUTES
Left message instructing patient/spouse to call dept @ [phone] between 8am-33pm.    Arrival time to be given @ 0845  [place] (Inst reviewed in portal)  (Message sent via [facility] portal)   
50

## 2024-07-15 ENCOUNTER — APPOINTMENT (OUTPATIENT)
Dept: FAMILY MEDICINE | Facility: CLINIC | Age: 59
End: 2024-07-15

## 2024-09-03 NOTE — ASU PATIENT PROFILE, ADULT - NS PRO TALK SOMEONE YN
Pt arrives to ED with complaints of \"I think I am having a chrohns flare up, and it sets me into DKA, my glucometer at home and in triage reading \"HI\". Pt vomiting as well, and lots of urination.    no

## 2025-04-04 ENCOUNTER — INPATIENT (INPATIENT)
Facility: HOSPITAL | Age: 60
LOS: 1 days | Discharge: HOME | DRG: 392 | End: 2025-04-06
Attending: STUDENT IN AN ORGANIZED HEALTH CARE EDUCATION/TRAINING PROGRAM | Admitting: STUDENT IN AN ORGANIZED HEALTH CARE EDUCATION/TRAINING PROGRAM
Payer: COMMERCIAL

## 2025-04-04 VITALS
OXYGEN SATURATION: 96 % | RESPIRATION RATE: 18 BRPM | SYSTOLIC BLOOD PRESSURE: 140 MMHG | TEMPERATURE: 100 F | HEART RATE: 108 BPM | WEIGHT: 200.4 LBS | DIASTOLIC BLOOD PRESSURE: 80 MMHG

## 2025-04-04 DIAGNOSIS — Z98.890 OTHER SPECIFIED POSTPROCEDURAL STATES: Chronic | ICD-10-CM

## 2025-04-04 DIAGNOSIS — Z98.891 HISTORY OF UTERINE SCAR FROM PREVIOUS SURGERY: Chronic | ICD-10-CM

## 2025-04-04 LAB
ALBUMIN SERPL ELPH-MCNC: 4.1 G/DL — SIGNIFICANT CHANGE UP (ref 3.3–5)
ALP SERPL-CCNC: 104 U/L — SIGNIFICANT CHANGE UP (ref 40–120)
ALT FLD-CCNC: 40 U/L — SIGNIFICANT CHANGE UP (ref 12–78)
ANION GAP SERPL CALC-SCNC: 4 MMOL/L — LOW (ref 5–17)
AST SERPL-CCNC: 20 U/L — SIGNIFICANT CHANGE UP (ref 15–37)
BASOPHILS # BLD AUTO: 0.05 K/UL — SIGNIFICANT CHANGE UP (ref 0–0.2)
BASOPHILS NFR BLD AUTO: 0.4 % — SIGNIFICANT CHANGE UP (ref 0–2)
BILIRUB SERPL-MCNC: 0.9 MG/DL — SIGNIFICANT CHANGE UP (ref 0.2–1.2)
BUN SERPL-MCNC: 13 MG/DL — SIGNIFICANT CHANGE UP (ref 7–23)
CALCIUM SERPL-MCNC: 9.8 MG/DL — SIGNIFICANT CHANGE UP (ref 8.5–10.1)
CHLORIDE SERPL-SCNC: 102 MMOL/L — SIGNIFICANT CHANGE UP (ref 96–108)
CO2 SERPL-SCNC: 31 MMOL/L — SIGNIFICANT CHANGE UP (ref 22–31)
CREAT SERPL-MCNC: 0.96 MG/DL — SIGNIFICANT CHANGE UP (ref 0.5–1.3)
EGFR: 68 ML/MIN/1.73M2 — SIGNIFICANT CHANGE UP
EGFR: 68 ML/MIN/1.73M2 — SIGNIFICANT CHANGE UP
EOSINOPHIL # BLD AUTO: 0.25 K/UL — SIGNIFICANT CHANGE UP (ref 0–0.5)
EOSINOPHIL NFR BLD AUTO: 2 % — SIGNIFICANT CHANGE UP (ref 0–6)
GLUCOSE SERPL-MCNC: 114 MG/DL — HIGH (ref 70–99)
HCT VFR BLD CALC: 43.7 % — SIGNIFICANT CHANGE UP (ref 34.5–45)
HGB BLD-MCNC: 15.4 G/DL — SIGNIFICANT CHANGE UP (ref 11.5–15.5)
IMM GRANULOCYTES # BLD AUTO: 0.03 K/UL — SIGNIFICANT CHANGE UP (ref 0–0.07)
IMM GRANULOCYTES NFR BLD AUTO: 0.2 % — SIGNIFICANT CHANGE UP (ref 0–0.9)
LIDOCAIN IGE QN: 88 U/L — HIGH (ref 13–75)
LYMPHOCYTES # BLD AUTO: 2.11 K/UL — SIGNIFICANT CHANGE UP (ref 1–3.3)
LYMPHOCYTES NFR BLD AUTO: 16.9 % — SIGNIFICANT CHANGE UP (ref 13–44)
MANUAL SMEAR VERIFICATION: SIGNIFICANT CHANGE UP
MCHC RBC-ENTMCNC: 30.5 PG — SIGNIFICANT CHANGE UP (ref 27–34)
MCHC RBC-ENTMCNC: 35.2 G/DL — SIGNIFICANT CHANGE UP (ref 32–36)
MCV RBC AUTO: 86.5 FL — SIGNIFICANT CHANGE UP (ref 80–100)
MONOCYTES # BLD AUTO: 0.84 K/UL — SIGNIFICANT CHANGE UP (ref 0–0.9)
MONOCYTES NFR BLD AUTO: 6.7 % — SIGNIFICANT CHANGE UP (ref 2–14)
NEUTROPHILS # BLD AUTO: 9.18 K/UL — HIGH (ref 1.8–7.4)
NEUTROPHILS NFR BLD AUTO: 73.8 % — SIGNIFICANT CHANGE UP (ref 43–77)
NRBC # BLD AUTO: 0 K/UL — SIGNIFICANT CHANGE UP (ref 0–0)
NRBC # FLD: 0 K/UL — SIGNIFICANT CHANGE UP (ref 0–0)
NRBC BLD AUTO-RTO: 0 /100 WBCS — SIGNIFICANT CHANGE UP (ref 0–0)
PLAT MORPH BLD: NORMAL — SIGNIFICANT CHANGE UP
PLATELET # BLD AUTO: 205 K/UL — SIGNIFICANT CHANGE UP (ref 150–400)
PMV BLD: 10.2 FL — SIGNIFICANT CHANGE UP (ref 7–13)
POTASSIUM SERPL-MCNC: 4.1 MMOL/L — SIGNIFICANT CHANGE UP (ref 3.5–5.3)
POTASSIUM SERPL-SCNC: 4.1 MMOL/L — SIGNIFICANT CHANGE UP (ref 3.5–5.3)
PROT SERPL-MCNC: 8.2 GM/DL — SIGNIFICANT CHANGE UP (ref 6–8.3)
RBC # BLD: 5.05 M/UL — SIGNIFICANT CHANGE UP (ref 3.8–5.2)
RBC # FLD: 12.2 % — SIGNIFICANT CHANGE UP (ref 10.3–14.5)
RBC BLD AUTO: NORMAL — SIGNIFICANT CHANGE UP
SODIUM SERPL-SCNC: 137 MMOL/L — SIGNIFICANT CHANGE UP (ref 135–145)
TROPONIN I, HIGH SENSITIVITY RESULT: 4.8 NG/L — SIGNIFICANT CHANGE UP
WBC # BLD: 12.46 K/UL — HIGH (ref 3.8–10.5)
WBC # FLD AUTO: 12.46 K/UL — HIGH (ref 3.8–10.5)
WBC MORPHOLOGY: NORMAL — SIGNIFICANT CHANGE UP

## 2025-04-04 PROCEDURE — 74177 CT ABD & PELVIS W/CONTRAST: CPT | Mod: 26

## 2025-04-04 PROCEDURE — 93010 ELECTROCARDIOGRAM REPORT: CPT

## 2025-04-04 PROCEDURE — 71045 X-RAY EXAM CHEST 1 VIEW: CPT | Mod: 26

## 2025-04-04 PROCEDURE — 99285 EMERGENCY DEPT VISIT HI MDM: CPT

## 2025-04-04 RX ORDER — ACETAMINOPHEN 500 MG/5ML
1000 LIQUID (ML) ORAL ONCE
Refills: 0 | Status: COMPLETED | OUTPATIENT
Start: 2025-04-04 | End: 2025-04-04

## 2025-04-04 RX ORDER — METRONIDAZOLE 250 MG
500 TABLET ORAL ONCE
Refills: 0 | Status: COMPLETED | OUTPATIENT
Start: 2025-04-04 | End: 2025-04-04

## 2025-04-04 RX ORDER — CIPROFLOXACIN HCL 250 MG
400 TABLET ORAL ONCE
Refills: 0 | Status: COMPLETED | OUTPATIENT
Start: 2025-04-04 | End: 2025-04-04

## 2025-04-04 RX ADMIN — Medication 1000 MILLILITER(S): at 22:20

## 2025-04-04 NOTE — ED ADULT TRIAGE NOTE - CHIEF COMPLAINT QUOTE
pt ambulatory to ED for LLQ pain x3 days. originally thought it was L sided hip pain. endorses fever and nausea. denies vomiting or diarrhea. took one extra strength Tylenol 1.5hr pta.

## 2025-04-04 NOTE — ED STATDOCS - PROGRESS NOTE DETAILS
[facility] for ED attending, Dr. Dennis   59 year old female with PMHx of breast CA s/p double mastectomy on exemestane, oncology Dr. Francoise Davis presents to ED c/o left lower quadrant abdominal pain x1 week, now with fevers and nausea for the past day. she reports some tightness to the chest. denies difficulty breathing, vomiting, dysuria, hematuria. she reports noticing polyuria the past few days. patient reports her LBM to be yesterday with no melena or hematemesis. history of hysterectomy. last dose Tylenol at approximately 15:30. patient has low grade fever of 100.2 after taking Tylenol PTA with chest pain and abdominal pain. patient receiving hormone therapy for breast CA. Will send to main for full workup.

## 2025-04-04 NOTE — ED STATDOCS - NSICDXPASTMEDICALHX_GEN_ALL_CORE_FT
PAST MEDICAL HISTORY:  History of alcoholism Ceased drinking 1996.    History of hypercholesterolemia not on medications at present    Menopausal bleeding post    Obesity     Snores      98.1

## 2025-04-04 NOTE — ED ADULT NURSE NOTE - OBJECTIVE STATEMENT
59 year old female with 3 day history of left lower abdominal pain with nausea and fever, denies vomiting, no diarrhea, no sick contacts, denies urinary symptoms

## 2025-04-04 NOTE — ED ADULT NURSE NOTE - NSFALLUNIVINTERV_ED_ALL_ED
Bed/Stretcher in lowest position, wheels locked, appropriate side rails in place/Call bell, personal items and telephone in reach/Instruct patient to call for assistance before getting out of bed/chair/stretcher/Non-slip footwear applied when patient is off stretcher/[place] to call system/Physically safe environment - no spills, clutter or unnecessary equipment/Purposeful proactive rounding/Room/bathroom lighting operational, light cord in reach

## 2025-04-05 DIAGNOSIS — Z29.9 ENCOUNTER FOR PROPHYLACTIC MEASURES, UNSPECIFIED: ICD-10-CM

## 2025-04-05 DIAGNOSIS — I10 ESSENTIAL (PRIMARY) HYPERTENSION: ICD-10-CM

## 2025-04-05 DIAGNOSIS — E78.5 HYPERLIPIDEMIA, UNSPECIFIED: ICD-10-CM

## 2025-04-05 DIAGNOSIS — K57.92 DIVERTICULITIS OF INTESTINE, PART UNSPECIFIED, WITHOUT PERFORATION OR ABSCESS WITHOUT BLEEDING: ICD-10-CM

## 2025-04-05 DIAGNOSIS — R91.1 SOLITARY PULMONARY NODULE: ICD-10-CM

## 2025-04-05 LAB
ALBUMIN SERPL ELPH-MCNC: 3.1 G/DL — LOW (ref 3.3–5)
ALP SERPL-CCNC: 77 U/L — SIGNIFICANT CHANGE UP (ref 40–120)
ALT FLD-CCNC: 28 U/L — SIGNIFICANT CHANGE UP (ref 12–78)
ANION GAP SERPL CALC-SCNC: 4 MMOL/L — LOW (ref 5–17)
APPEARANCE UR: CLEAR — SIGNIFICANT CHANGE UP
AST SERPL-CCNC: 7 U/L — LOW (ref 15–37)
BILIRUB SERPL-MCNC: 0.6 MG/DL — SIGNIFICANT CHANGE UP (ref 0.2–1.2)
BILIRUB UR-MCNC: NEGATIVE — SIGNIFICANT CHANGE UP
BUN SERPL-MCNC: 9 MG/DL — SIGNIFICANT CHANGE UP (ref 7–23)
CALCIUM SERPL-MCNC: 8.6 MG/DL — SIGNIFICANT CHANGE UP (ref 8.5–10.1)
CHLORIDE SERPL-SCNC: 109 MMOL/L — HIGH (ref 96–108)
CO2 SERPL-SCNC: 28 MMOL/L — SIGNIFICANT CHANGE UP (ref 22–31)
COLOR SPEC: YELLOW — SIGNIFICANT CHANGE UP
CREAT SERPL-MCNC: 0.67 MG/DL — SIGNIFICANT CHANGE UP (ref 0.5–1.3)
DIFF PNL FLD: NEGATIVE — SIGNIFICANT CHANGE UP
EGFR: 101 ML/MIN/1.73M2 — SIGNIFICANT CHANGE UP
EGFR: 101 ML/MIN/1.73M2 — SIGNIFICANT CHANGE UP
GLUCOSE SERPL-MCNC: 106 MG/DL — HIGH (ref 70–99)
GLUCOSE UR QL: NEGATIVE MG/DL — SIGNIFICANT CHANGE UP
HCT VFR BLD CALC: 36.3 % — SIGNIFICANT CHANGE UP (ref 34.5–45)
HGB BLD-MCNC: 12.7 G/DL — SIGNIFICANT CHANGE UP (ref 11.5–15.5)
KETONES UR-MCNC: NEGATIVE MG/DL — SIGNIFICANT CHANGE UP
LACTATE SERPL-SCNC: 0.9 MMOL/L — SIGNIFICANT CHANGE UP (ref 0.7–2)
LEUKOCYTE ESTERASE UR-ACNC: NEGATIVE — SIGNIFICANT CHANGE UP
MAGNESIUM SERPL-MCNC: 2.1 MG/DL — SIGNIFICANT CHANGE UP (ref 1.6–2.6)
MCHC RBC-ENTMCNC: 30 PG — SIGNIFICANT CHANGE UP (ref 27–34)
MCHC RBC-ENTMCNC: 35 G/DL — SIGNIFICANT CHANGE UP (ref 32–36)
MCV RBC AUTO: 85.8 FL — SIGNIFICANT CHANGE UP (ref 80–100)
NITRITE UR-MCNC: NEGATIVE — SIGNIFICANT CHANGE UP
NRBC # BLD AUTO: 0 K/UL — SIGNIFICANT CHANGE UP (ref 0–0)
NRBC # FLD: 0 K/UL — SIGNIFICANT CHANGE UP (ref 0–0)
NRBC BLD AUTO-RTO: 0 /100 WBCS — SIGNIFICANT CHANGE UP (ref 0–0)
PH UR: 5.5 — SIGNIFICANT CHANGE UP (ref 5–8)
PHOSPHATE SERPL-MCNC: 3.1 MG/DL — SIGNIFICANT CHANGE UP (ref 2.5–4.5)
PLATELET # BLD AUTO: 149 K/UL — LOW (ref 150–400)
PMV BLD: 9.4 FL — SIGNIFICANT CHANGE UP (ref 7–13)
POTASSIUM SERPL-MCNC: 3.6 MMOL/L — SIGNIFICANT CHANGE UP (ref 3.5–5.3)
POTASSIUM SERPL-SCNC: 3.6 MMOL/L — SIGNIFICANT CHANGE UP (ref 3.5–5.3)
PROT SERPL-MCNC: 6.2 GM/DL — SIGNIFICANT CHANGE UP (ref 6–8.3)
PROT UR-MCNC: NEGATIVE MG/DL — SIGNIFICANT CHANGE UP
RBC # BLD: 4.23 M/UL — SIGNIFICANT CHANGE UP (ref 3.8–5.2)
RBC # FLD: 12.2 % — SIGNIFICANT CHANGE UP (ref 10.3–14.5)
SODIUM SERPL-SCNC: 141 MMOL/L — SIGNIFICANT CHANGE UP (ref 135–145)
SP GR SPEC: >1.03 — HIGH (ref 1–1.03)
UROBILINOGEN FLD QL: 0.2 MG/DL — SIGNIFICANT CHANGE UP (ref 0.2–1)
WBC # BLD: 7.04 K/UL — SIGNIFICANT CHANGE UP (ref 3.8–10.5)
WBC # FLD AUTO: 7.04 K/UL — SIGNIFICANT CHANGE UP (ref 3.8–10.5)

## 2025-04-05 PROCEDURE — 83735 ASSAY OF MAGNESIUM: CPT

## 2025-04-05 PROCEDURE — 84100 ASSAY OF PHOSPHORUS: CPT

## 2025-04-05 PROCEDURE — 81003 URINALYSIS AUTO W/O SCOPE: CPT

## 2025-04-05 PROCEDURE — 80053 COMPREHEN METABOLIC PANEL: CPT

## 2025-04-05 PROCEDURE — 85027 COMPLETE CBC AUTOMATED: CPT

## 2025-04-05 PROCEDURE — 99222 1ST HOSP IP/OBS MODERATE 55: CPT

## 2025-04-05 PROCEDURE — 36415 COLL VENOUS BLD VENIPUNCTURE: CPT

## 2025-04-05 RX ORDER — METRONIDAZOLE 250 MG
500 TABLET ORAL EVERY 12 HOURS
Refills: 0 | Status: DISCONTINUED | OUTPATIENT
Start: 2025-04-05 | End: 2025-04-06

## 2025-04-05 RX ORDER — SODIUM CHLORIDE 9 G/1000ML
1000 INJECTION, SOLUTION INTRAVENOUS
Refills: 0 | Status: COMPLETED | OUTPATIENT
Start: 2025-04-05 | End: 2025-04-05

## 2025-04-05 RX ORDER — SERTRALINE 100 MG/1
100 TABLET, FILM COATED ORAL DAILY
Refills: 0 | Status: DISCONTINUED | OUTPATIENT
Start: 2025-04-05 | End: 2025-04-06

## 2025-04-05 RX ORDER — ROSUVASTATIN CALCIUM 5 MG/1
10 TABLET, FILM COATED ORAL AT BEDTIME
Refills: 0 | Status: DISCONTINUED | OUTPATIENT
Start: 2025-04-05 | End: 2025-04-05

## 2025-04-05 RX ORDER — HEPARIN SODIUM 1000 [USP'U]/ML
5000 INJECTION INTRAVENOUS; SUBCUTANEOUS EVERY 12 HOURS
Refills: 0 | Status: DISCONTINUED | OUTPATIENT
Start: 2025-04-05 | End: 2025-04-06

## 2025-04-05 RX ORDER — ROSUVASTATIN CALCIUM 5 MG/1
20 TABLET, FILM COATED ORAL AT BEDTIME
Refills: 0 | Status: DISCONTINUED | OUTPATIENT
Start: 2025-04-05 | End: 2025-04-06

## 2025-04-05 RX ORDER — INFLUENZA A VIRUS A/IDAHO/07/2018 (H1N1) ANTIGEN (MDCK CELL DERIVED, PROPIOLACTONE INACTIVATED, INFLUENZA A VIRUS A/INDIANA/08/2018 (H3N2) ANTIGEN (MDCK CELL DERIVED, PROPIOLACTONE INACTIVATED), INFLUENZA B VIRUS B/SINGAPORE/INFTT-16-0610/2016 ANTIGEN (MDCK CELL DERIVED, PROPIOLACTONE INACTIVATED), INFLUENZA B VIRUS B/IOWA/06/2017 ANTIGEN (MDCK CELL DERIVED, PROPIOLACTONE INACTIVATED) 15; 15; 15; 15 UG/.5ML; UG/.5ML; UG/.5ML; UG/.5ML
0.5 INJECTION, SUSPENSION INTRAMUSCULAR ONCE
Refills: 0 | Status: COMPLETED | OUTPATIENT
Start: 2025-04-05 | End: 2025-04-05

## 2025-04-05 RX ORDER — ACETAMINOPHEN 500 MG/5ML
650 LIQUID (ML) ORAL EVERY 6 HOURS
Refills: 0 | Status: DISCONTINUED | OUTPATIENT
Start: 2025-04-05 | End: 2025-04-06

## 2025-04-05 RX ORDER — ONDANSETRON HCL/PF 4 MG/2 ML
4 VIAL (ML) INJECTION EVERY 8 HOURS
Refills: 0 | Status: DISCONTINUED | OUTPATIENT
Start: 2025-04-05 | End: 2025-04-06

## 2025-04-05 RX ORDER — ASPIRIN 325 MG
81 TABLET ORAL DAILY
Refills: 0 | Status: DISCONTINUED | OUTPATIENT
Start: 2025-04-05 | End: 2025-04-06

## 2025-04-05 RX ORDER — MELATONIN 5 MG
3 TABLET ORAL AT BEDTIME
Refills: 0 | Status: DISCONTINUED | OUTPATIENT
Start: 2025-04-05 | End: 2025-04-06

## 2025-04-05 RX ORDER — CEFTRIAXONE 500 MG/1
1000 INJECTION, POWDER, FOR SOLUTION INTRAMUSCULAR; INTRAVENOUS EVERY 24 HOURS
Refills: 0 | Status: DISCONTINUED | OUTPATIENT
Start: 2025-04-05 | End: 2025-04-06

## 2025-04-05 RX ORDER — METOPROLOL SUCCINATE 50 MG/1
25 TABLET, EXTENDED RELEASE ORAL DAILY
Refills: 0 | Status: DISCONTINUED | OUTPATIENT
Start: 2025-04-05 | End: 2025-04-06

## 2025-04-05 RX ORDER — MAGNESIUM, ALUMINUM HYDROXIDE 200-200 MG
30 TABLET,CHEWABLE ORAL EVERY 4 HOURS
Refills: 0 | Status: DISCONTINUED | OUTPATIENT
Start: 2025-04-05 | End: 2025-04-06

## 2025-04-05 RX ADMIN — SODIUM CHLORIDE 100 MILLILITER(S): 9 INJECTION, SOLUTION INTRAVENOUS at 18:08

## 2025-04-05 RX ADMIN — Medication 200 MILLIGRAM(S): at 02:00

## 2025-04-05 RX ADMIN — Medication 500 MILLIGRAM(S): at 21:44

## 2025-04-05 RX ADMIN — Medication 400 MILLIGRAM(S): at 00:26

## 2025-04-05 RX ADMIN — Medication 650 MILLIGRAM(S): at 09:08

## 2025-04-05 RX ADMIN — Medication 500 MILLIGRAM(S): at 11:11

## 2025-04-05 RX ADMIN — HEPARIN SODIUM 5000 UNIT(S): 1000 INJECTION INTRAVENOUS; SUBCUTANEOUS at 11:11

## 2025-04-05 RX ADMIN — METOPROLOL SUCCINATE 25 MILLIGRAM(S): 50 TABLET, EXTENDED RELEASE ORAL at 17:22

## 2025-04-05 RX ADMIN — SODIUM CHLORIDE 100 MILLILITER(S): 9 INJECTION, SOLUTION INTRAVENOUS at 16:53

## 2025-04-05 RX ADMIN — Medication 2000 MILLILITER(S): at 00:27

## 2025-04-05 RX ADMIN — Medication 650 MILLIGRAM(S): at 08:37

## 2025-04-05 RX ADMIN — ROSUVASTATIN CALCIUM 20 MILLIGRAM(S): 5 TABLET, FILM COATED ORAL at 21:44

## 2025-04-05 RX ADMIN — Medication 100 MILLIGRAM(S): at 00:27

## 2025-04-05 RX ADMIN — CEFTRIAXONE 1000 MILLIGRAM(S): 500 INJECTION, POWDER, FOR SOLUTION INTRAMUSCULAR; INTRAVENOUS at 08:37

## 2025-04-05 NOTE — ED PROVIDER NOTE - CLINICAL SUMMARY MEDICAL DECISION MAKING FREE TEXT BOX
59-year-old female with fever and abdominal pain will obtain labs and imaging to further evaluate.  Diverticulitis noted on CT.  Results discussed with patient and family at bedside.  IV antibiotics and fluids ordered as well as+ medication for pain control.  Will admit for further care.

## 2025-04-05 NOTE — PROGRESS NOTE ADULT - SUBJECTIVE AND OBJECTIVE BOX
HOSPITALIST ATTENDING PROGRESS NOTE    Chart and meds reviewed.      Subjective: Patient seen and examined. Resting comfortably. Abdominal pain has improved from yesterday Continue IV ceftriaxone and PO flagyl. Will advance to clears and if tolerates will advance to Full liquid diet. Patient afebrile. Patient denies chest pain shortness of breath fevers chills nausea vomiting diarrhea. WBC now WNL. Continue to monitor.       Additional results/Imaging, I have personally reviewed:    LABS:                            12.7   7.04  )-----------( 149      ( 05 Apr 2025 06:41 )             36.3     04-05    141  |  109[H]  |  9   ----------------------------<  106[H]  3.6   |  28  |  0.67    Ca    8.6      05 Apr 2025 06:41  Phos  3.1     04-05  Mg     2.1     04-05    TPro  6.2  /  Alb  3.1[L]  /  TBili  0.6  /  DBili  x   /  AST  7[L]  /  ALT  28  /  AlkPhos  77  04-05        LIVER FUNCTIONS - ( 05 Apr 2025 06:41 )  Alb: 3.1 g/dL / Pro: 6.2 gm/dL / ALK PHOS: 77 U/L / ALT: 28 U/L / AST: 7 U/L / GGT: x             Urinalysis Basic - ( 05 Apr 2025 06:41 )    Color: x / Appearance: x / SG: x / pH: x  Gluc: 106 mg/dL / Ketone: x  / Bili: x / Urobili: x   Blood: x / Protein: x / Nitrite: x   Leuk Esterase: x / RBC: x / WBC x   Sq Epi: x / Non Sq Epi: x / Bacteria: x        Lactate, Blood: 0.9 mmol/L (04-05 @ 00:37)        All other systems reviewed and found to be negative with the exception of what has been described above.    MEDICATIONS  (STANDING):  aspirin enteric coated 81 milliGRAM(s) Oral daily  cefTRIAXone Injectable. 1000 milliGRAM(s) IV Push every 24 hours  heparin   Injectable 5000 Unit(s) SubCutaneous every 12 hours  influenza   Vaccine 0.5 milliLiter(s) IntraMuscular once  lactated ringers. 1000 milliLiter(s) (100 mL/Hr) IV Continuous <Continuous>  metroNIDAZOLE    Tablet 500 milliGRAM(s) Oral every 12 hours  pantoprazole    Tablet 40 milliGRAM(s) Oral before breakfast  rosuvastatin 10 milliGRAM(s) Oral at bedtime  sertraline 100 milliGRAM(s) Oral daily    MEDICATIONS  (PRN):  acetaminophen     Tablet .. 650 milliGRAM(s) Oral every 6 hours PRN Temp greater or equal to 38C (100.4F), Mild Pain (1 - 3)  aluminum hydroxide/magnesium hydroxide/simethicone Suspension 30 milliLiter(s) Oral every 4 hours PRN Dyspepsia  melatonin 3 milliGRAM(s) Oral at bedtime PRN Insomnia  ondansetron Injectable 4 milliGRAM(s) IV Push every 8 hours PRN Nausea and/or Vomiting      VITALS:  T(F): 97.8 (04-05-25 @ 07:38), Max: 100.2 (04-04-25 @ 19:36)  HR: 81 (04-05-25 @ 07:38) (81 - 108)  BP: 118/67 (04-05-25 @ 07:38) (118/67 - 140/80)  RR: 18 (04-05-25 @ 07:38) (18 - 18)  SpO2: 96% (04-05-25 @ 07:38) (96% - 99%)  Wt(kg): --    I&O's Summary      CAPILLARY BLOOD GLUCOSE          PHYSICAL EXAM:  General: non-toxic  HEENT: non-traumatic, perrla, eomi  Cardio: s1s2 regular rate and rhythm  Lungs: comfortable breathing, clear to auscultation  Abdomen: Soft, LLQ tenderness  Neuro: AOx4  Ext: Pulses +2      CULTURES:  Blood, Urine: Negative (04-05 @ 01:25)      Telemetry, personally reviewed

## 2025-04-05 NOTE — H&P ADULT - NSHPLABSRESULTS_GEN_ALL_CORE
LABS:  cret                        15.4   12.46 )-----------( 205      ( 04 Apr 2025 20:59 )             43.7     04-04    137  |  102  |  13  ----------------------------<  114[H]  4.1   |  31  |  0.96    Ca    9.8      04 Apr 2025 20:59    TPro  8.2  /  Alb  4.1  /  TBili  0.9  /  DBili  x   /  AST  20  /  ALT  40  /  AlkPhos  104  04-04    Urinalysis with Rflx Culture (collected 04-05-25 @ 01:25)    < from: CT Abdomen and Pelvis w/ IV Cont (04.04.25 @ 22:18) >    IMPRESSION:  Colonic diverticulosis. Relatively focalwall thickening and pericolonic   stranding involving the descending colon, which may reflect acute   uncomplicated diverticulitis versus nonspecific focal colitis.    6 mm pulmonary nodule at the left base. Given prior history of   malignancy, recommend further evaluation with chest CT.        --- End of Report ---    < end of copied text >

## 2025-04-05 NOTE — H&P ADULT - HISTORY OF PRESENT ILLNESS
59F with h/o breast CA s/p b/l mastectomy, HTN, HLD presents with complaints of LLQ pain for the past 2-3 days associated with fevers, chills, and nausea. She went to her PCP and was referred to ED for concerns of diverticulitis. Initial work up noted for elevated WBC of 12, and CT abdomen noted for diverticulitis, and an incidental 6mm pulmonary nodule. Patient admitted for diverticulitis

## 2025-04-05 NOTE — PROGRESS NOTE ADULT - ASSESSMENT
59F admitted for diverticulitis.      Diverticulitis.   Continue Ceftriaxone and flagyl x 10 days  Continue to advance diet as tolerated       HTN (hypertension).    Normotensive.  Resume home metoprolol        HLD (hyperlipidemia).   Resume home rosuvastatin 20mg qd     GERD  Continue home Protonix        history of Breast cancer.   Pulmonary nodule.   Incidental finding on CT  6 mm pulmonary nodule at the left base. Given prior history of   malignancy, recommend further evaluation with chest CT.  F/u as outpatient.      DVT ppx: Hepsubq. ambulation

## 2025-04-05 NOTE — ED PROVIDER NOTE - PHYSICAL EXAMINATION
***GEN - NAD; well appearing; A+O x3 ***HEAD - NC/AT ***EYES/NOSE - PERRL, EOMI, mucous membranes moist, no discharge ***THROAT: Oral cavity and pharynx normal. No inflammation, swelling, exudate, or lesions.  ***NECK: Neck supple, non-tender   ***PULMONARY - CTA b/l, symmetric breath sounds. ***CARDIAC -s1s2, RRR, no M,G,R  ***ABDOMEN - +BS, ND, +ttp over lower abdomen, soft  ***BACK - no CVA tenderness, Normal  spine ***EXTREMITIES - symmetric pulses, 2+ dp, capillary refill < 2 seconds ***SKIN - no rash or bruising   ***NEUROLOGIC - alert, CN 2-12 intact

## 2025-04-05 NOTE — ED PROVIDER NOTE - OBJECTIVE STATEMENT
59 year old female with PMHx of breast CA s/p double mastectomy on exemestane, oncology Dr. Francoise Davis presents to ED c/o left lower quadrant abdominal pain x1 week, now with fevers and nausea for the past day. she reports some tightness to the chest. denies difficulty breathing, vomiting, dysuria, hematuria.

## 2025-04-05 NOTE — H&P ADULT - NSHPPHYSICALEXAM_GEN_ALL_CORE
T(C): 36.8 (04-05-25 @ 04:29), Max: 37.9 (04-04-25 @ 19:36)  HR: 82 (04-05-25 @ 04:29) (82 - 108)  BP: 120/60 (04-05-25 @ 04:29) (120/60 - 140/80)  RR: 18 (04-05-25 @ 04:29) (18 - 18)  SpO2: 99% (04-05-25 @ 04:29) (96% - 99%)    General: non-toxic  HEENT: non-traumatic, perrla, eomi  Cardio: s1s2 regular rate and rhythm  Lungs: comfortable breathing, clear to auscultation  Abdomen: Soft, LLQ tenderness  Neuro: AOx4  Ext: Pulses +2

## 2025-04-05 NOTE — PATIENT PROFILE ADULT - FALL HARM RISK - UNIVERSAL INTERVENTIONS
Bed in lowest position, wheels locked, appropriate side rails in place/Call bell, personal items and telephone in reach/Instruct patient to call for assistance before getting out of bed or chair/Non-slip footwear when patient is out of bed/[place] to call system/Physically safe environment - no spills, clutter or unnecessary equipment/Purposeful Proactive Rounding/Room/bathroom lighting operational, light cord in reach

## 2025-04-06 VITALS
OXYGEN SATURATION: 97 % | HEART RATE: 80 BPM | SYSTOLIC BLOOD PRESSURE: 134 MMHG | RESPIRATION RATE: 18 BRPM | DIASTOLIC BLOOD PRESSURE: 67 MMHG

## 2025-04-06 LAB
ALBUMIN SERPL ELPH-MCNC: 3.2 G/DL — LOW (ref 3.3–5)
ALP SERPL-CCNC: 75 U/L — SIGNIFICANT CHANGE UP (ref 40–120)
ALT FLD-CCNC: 25 U/L — SIGNIFICANT CHANGE UP (ref 12–78)
ANION GAP SERPL CALC-SCNC: 3 MMOL/L — LOW (ref 5–17)
AST SERPL-CCNC: 11 U/L — LOW (ref 15–37)
BILIRUB SERPL-MCNC: 0.4 MG/DL — SIGNIFICANT CHANGE UP (ref 0.2–1.2)
BUN SERPL-MCNC: 7 MG/DL — SIGNIFICANT CHANGE UP (ref 7–23)
CALCIUM SERPL-MCNC: 9.1 MG/DL — SIGNIFICANT CHANGE UP (ref 8.5–10.1)
CHLORIDE SERPL-SCNC: 109 MMOL/L — HIGH (ref 96–108)
CO2 SERPL-SCNC: 27 MMOL/L — SIGNIFICANT CHANGE UP (ref 22–31)
CREAT SERPL-MCNC: 0.62 MG/DL — SIGNIFICANT CHANGE UP (ref 0.5–1.3)
EGFR: 103 ML/MIN/1.73M2 — SIGNIFICANT CHANGE UP
EGFR: 103 ML/MIN/1.73M2 — SIGNIFICANT CHANGE UP
GLUCOSE SERPL-MCNC: 101 MG/DL — HIGH (ref 70–99)
HCT VFR BLD CALC: 36.4 % — SIGNIFICANT CHANGE UP (ref 34.5–45)
HGB BLD-MCNC: 12.7 G/DL — SIGNIFICANT CHANGE UP (ref 11.5–15.5)
MCHC RBC-ENTMCNC: 30 PG — SIGNIFICANT CHANGE UP (ref 27–34)
MCHC RBC-ENTMCNC: 34.9 G/DL — SIGNIFICANT CHANGE UP (ref 32–36)
MCV RBC AUTO: 86.1 FL — SIGNIFICANT CHANGE UP (ref 80–100)
NRBC # BLD AUTO: 0 K/UL — SIGNIFICANT CHANGE UP (ref 0–0)
NRBC # FLD: 0 K/UL — SIGNIFICANT CHANGE UP (ref 0–0)
NRBC BLD AUTO-RTO: 0 /100 WBCS — SIGNIFICANT CHANGE UP (ref 0–0)
PLATELET # BLD AUTO: 169 K/UL — SIGNIFICANT CHANGE UP (ref 150–400)
PMV BLD: 9.4 FL — SIGNIFICANT CHANGE UP (ref 7–13)
POTASSIUM SERPL-MCNC: 3.8 MMOL/L — SIGNIFICANT CHANGE UP (ref 3.5–5.3)
POTASSIUM SERPL-SCNC: 3.8 MMOL/L — SIGNIFICANT CHANGE UP (ref 3.5–5.3)
PROT SERPL-MCNC: 6.5 GM/DL — SIGNIFICANT CHANGE UP (ref 6–8.3)
RBC # BLD: 4.23 M/UL — SIGNIFICANT CHANGE UP (ref 3.8–5.2)
RBC # FLD: 12.1 % — SIGNIFICANT CHANGE UP (ref 10.3–14.5)
SODIUM SERPL-SCNC: 139 MMOL/L — SIGNIFICANT CHANGE UP (ref 135–145)
WBC # BLD: 4.41 K/UL — SIGNIFICANT CHANGE UP (ref 3.8–10.5)
WBC # FLD AUTO: 4.41 K/UL — SIGNIFICANT CHANGE UP (ref 3.8–10.5)

## 2025-04-06 PROCEDURE — 99239 HOSP IP/OBS DSCHRG MGMT >30: CPT

## 2025-04-06 RX ORDER — CEFUROXIME SODIUM 1.5 G
1 VIAL (EA) INJECTION
Qty: 16 | Refills: 0
Start: 2025-04-06

## 2025-04-06 RX ORDER — METRONIDAZOLE 250 MG
1 TABLET ORAL
Qty: 24 | Refills: 0
Start: 2025-04-06

## 2025-04-06 RX ORDER — ROSUVASTATIN CALCIUM 5 MG/1
1 TABLET, FILM COATED ORAL
Refills: 0 | DISCHARGE

## 2025-04-06 RX ORDER — EXEMESTANE 25 MG/1
1 TABLET, FILM COATED ORAL
Refills: 0 | DISCHARGE

## 2025-04-06 RX ORDER — HYDROCHLOROTHIAZIDE 50 MG/1
1 TABLET ORAL
Refills: 0 | DISCHARGE

## 2025-04-06 RX ORDER — METOPROLOL SUCCINATE 50 MG/1
1 TABLET, EXTENDED RELEASE ORAL
Refills: 0 | DISCHARGE

## 2025-04-06 RX ADMIN — Medication 81 MILLIGRAM(S): at 09:21

## 2025-04-06 RX ADMIN — CEFTRIAXONE 1000 MILLIGRAM(S): 500 INJECTION, POWDER, FOR SOLUTION INTRAMUSCULAR; INTRAVENOUS at 09:12

## 2025-04-06 RX ADMIN — Medication 40 MILLIGRAM(S): at 06:22

## 2025-04-06 RX ADMIN — METOPROLOL SUCCINATE 25 MILLIGRAM(S): 50 TABLET, EXTENDED RELEASE ORAL at 09:21

## 2025-04-06 RX ADMIN — Medication 500 MILLIGRAM(S): at 09:21

## 2025-04-06 NOTE — DISCHARGE NOTE PROVIDER - CARE PROVIDER_API CALL
Susan Zavala  Internal Medicine  [address] [place] [id number]  Phone: [phone]  Fax: [phone]  Follow Up Time: 1 week

## 2025-04-06 NOTE — DISCHARGE NOTE PROVIDER - HOSPITAL COURSE
59F with h/o breast CA s/p b/l mastectomy, HTN, HLD presents with complaints of LLQ pain for the past 2-3 days associated with fevers, chills, and nausea. She went to her PCP and was referred to ED for concerns of diverticulitis. Initial work up noted for elevated WBC of 12, and CT abdomen noted for diverticulitis, and an incidental 6mm pulmonary nodule. Patient admitted to the hospitalist service for diverticulitis. Started on rocephin and flagyl with good improvement. Diet slowly advanced as tolerated. Patient started on IV fluids. and will transition to PO flagyl and ceftin x 8 days upon discharge. Recommend to hold zepbound until follow up with PCP. Incidental finding on CT 6 mm pulmonary nodule at the left base. Given prior history of malignancy, recommend further evaluation with chest CT outpatient. Follow up with PCP in 1-2 weeks. Advised to return to ED with any worsening symptoms chest pain shortness of breath fevers chills nausea vomiting diarrhea.

## 2025-04-06 NOTE — DISCHARGE NOTE PROVIDER - NSDCCPCAREPLAN_GEN_ALL_CORE_FT
PRINCIPAL DISCHARGE DIAGNOSIS  Diagnosis: Diverticulitis  Assessment and Plan of Treatment: Discharge Instructions for Diverticulitis  Medications:  Ceftin: Take as directed for 8 days.  Flagyl: Follow the prescribed dosage for 8 days.  Pain Management: Use acetaminophen for discomfort.   Dietary Guidelines:  Gradually increase fiber intake with fruits, vegetables, and whole grains.  Stay hydrated with plenty of fluids.  Activity:  Rest initially, then slowly resume normal activities. Avoid strain.  Follow-Up Care:  Schedule a follow-up visit with your healthcare provider in 1-4 weeks.  Monitor for symptoms like increased pain, fever, or bleeding. Contact your doctor if they occur.  Lifestyle Changes:  Incorporate regular, gentle exercise.  If you smoke, seek help to quit.  Manage stress with relaxation techniques like yoga or meditation.  Emergency: For severe pain, fever, persistent vomiting, or blood in stool, seek immediate medical attention.

## 2025-04-06 NOTE — DISCHARGE NOTE PROVIDER - ATTENDING DISCHARGE PHYSICAL EXAMINATION:
General: non-toxic  HEENT: non-traumatic, perrla, eomi  Cardio: s1s2 regular rate and rhythm  Lungs: comfortable breathing, clear to auscultation  Abdomen: Soft, LLQ tenderness  Neuro: AOx4  Ext: Pulses +2 No

## 2025-04-06 NOTE — DISCHARGE NOTE PROVIDER - NSDCMRMEDTOKEN_GEN_ALL_CORE_FT
aspirin 81 mg oral delayed release tablet: 1 tab(s) orally once a day   cefuroxime 500 mg oral tablet: 1 tab(s) orally 2 times a day x 8 days  exemestane 25 mg oral tablet: 1 tab(s) orally once a day  hydroCHLOROthiazide 25 mg oral tablet: 1 tab(s) orally once a day  metoprolol succinate 25 mg oral tablet, extended release: 1 tab(s) orally once a day  metroNIDAZOLE 500 mg oral tablet: 1 tab(s) orally every 8 hours  rosuvastatin 20 mg oral tablet: 1 tab(s) orally once a day

## 2025-04-06 NOTE — DISCHARGE NOTE NURSING/CASE MANAGEMENT/SOCIAL WORK - PATIENT PORTAL LINK FT
You can access the FollowMyHealth Patient Portal offered by [facility] by registering at the following website: [email or web address]. By joining [facility]’s FollowMyHealth portal, you will also be able to view your health information using other applications (apps) compatible with our system.

## 2025-04-06 NOTE — DISCHARGE NOTE NURSING/CASE MANAGEMENT/SOCIAL WORK - FINANCIAL ASSISTANCE
[facility] provides services at a reduced cost to those who are determined to be eligible through [facility]’s financial assistance program. Information regarding [facility]’s financial assistance program can be found by going to [email or web address] or by calling [phone].

## 2025-04-10 DIAGNOSIS — Z91.048 OTHER NONMEDICINAL SUBSTANCE ALLERGY STATUS: ICD-10-CM

## 2025-04-10 DIAGNOSIS — I10 ESSENTIAL (PRIMARY) HYPERTENSION: ICD-10-CM

## 2025-04-10 DIAGNOSIS — Z82.49 FAMILY HISTORY OF ISCHEMIC HEART DISEASE AND OTHER DISEASES OF THE CIRCULATORY SYSTEM: ICD-10-CM

## 2025-04-10 DIAGNOSIS — R91.1 SOLITARY PULMONARY NODULE: ICD-10-CM

## 2025-04-10 DIAGNOSIS — Z82.3 FAMILY HISTORY OF STROKE: ICD-10-CM

## 2025-04-10 DIAGNOSIS — E66.9 OBESITY, UNSPECIFIED: ICD-10-CM

## 2025-04-10 DIAGNOSIS — E78.5 HYPERLIPIDEMIA, UNSPECIFIED: ICD-10-CM

## 2025-04-10 DIAGNOSIS — K21.9 GASTRO-ESOPHAGEAL REFLUX DISEASE WITHOUT ESOPHAGITIS: ICD-10-CM

## 2025-04-10 DIAGNOSIS — F10.21 ALCOHOL DEPENDENCE, IN REMISSION: ICD-10-CM

## 2025-04-10 DIAGNOSIS — Z79.82 LONG TERM (CURRENT) USE OF ASPIRIN: ICD-10-CM

## 2025-04-10 DIAGNOSIS — E78.00 PURE HYPERCHOLESTEROLEMIA, UNSPECIFIED: ICD-10-CM

## 2025-04-10 DIAGNOSIS — Z90.13 ACQUIRED ABSENCE OF BILATERAL BREASTS AND NIPPLES: ICD-10-CM

## 2025-04-10 DIAGNOSIS — Z83.3 FAMILY HISTORY OF DIABETES MELLITUS: ICD-10-CM

## 2025-04-10 DIAGNOSIS — K57.92 DIVERTICULITIS OF INTESTINE, PART UNSPECIFIED, WITHOUT PERFORATION OR ABSCESS WITHOUT BLEEDING: ICD-10-CM

## 2025-04-10 LAB
CULTURE RESULTS: SIGNIFICANT CHANGE UP
CULTURE RESULTS: SIGNIFICANT CHANGE UP
SPECIMEN SOURCE: SIGNIFICANT CHANGE UP
SPECIMEN SOURCE: SIGNIFICANT CHANGE UP